# Patient Record
Sex: FEMALE | Race: ASIAN | NOT HISPANIC OR LATINO | Employment: FULL TIME | URBAN - METROPOLITAN AREA
[De-identification: names, ages, dates, MRNs, and addresses within clinical notes are randomized per-mention and may not be internally consistent; named-entity substitution may affect disease eponyms.]

---

## 2017-09-29 ENCOUNTER — HOSPITAL ENCOUNTER (OUTPATIENT)
Dept: ULTRASOUND IMAGING | Facility: HOSPITAL | Age: 42
Discharge: HOME | End: 2017-09-29
Attending: FAMILY MEDICINE

## 2017-09-29 ENCOUNTER — OFFICE VISIT - HEALTHEAST (OUTPATIENT)
Dept: FAMILY MEDICINE | Facility: CLINIC | Age: 42
End: 2017-09-29

## 2017-09-29 DIAGNOSIS — R10.9 ABDOMINAL PAIN: ICD-10-CM

## 2017-10-05 ENCOUNTER — OFFICE VISIT - HEALTHEAST (OUTPATIENT)
Dept: FAMILY MEDICINE | Facility: CLINIC | Age: 42
End: 2017-10-05

## 2017-10-05 DIAGNOSIS — L70.9 ACNE: ICD-10-CM

## 2017-10-05 DIAGNOSIS — Z23 IMMUNIZATION DUE: ICD-10-CM

## 2017-10-05 DIAGNOSIS — K29.70 GASTRITIS: ICD-10-CM

## 2017-10-05 ASSESSMENT — MIFFLIN-ST. JEOR: SCORE: 1186.59

## 2017-10-12 ASSESSMENT — MIFFLIN-ST. JEOR: SCORE: 1168.45

## 2017-10-13 ENCOUNTER — SURGERY - HEALTHEAST (OUTPATIENT)
Dept: GASTROENTEROLOGY | Facility: HOSPITAL | Age: 42
End: 2017-10-13

## 2017-10-13 ASSESSMENT — MIFFLIN-ST. JEOR: SCORE: 1182.06

## 2017-10-15 ENCOUNTER — COMMUNICATION - HEALTHEAST (OUTPATIENT)
Dept: FAMILY MEDICINE | Facility: CLINIC | Age: 42
End: 2017-10-15

## 2017-11-01 ENCOUNTER — COMMUNICATION - HEALTHEAST (OUTPATIENT)
Dept: FAMILY MEDICINE | Facility: CLINIC | Age: 42
End: 2017-11-01

## 2017-11-03 ENCOUNTER — RECORDS - HEALTHEAST (OUTPATIENT)
Dept: MAMMOGRAPHY | Facility: CLINIC | Age: 42
End: 2017-11-03

## 2017-11-03 DIAGNOSIS — Z12.31 ENCOUNTER FOR SCREENING MAMMOGRAM FOR MALIGNANT NEOPLASM OF BREAST: ICD-10-CM

## 2017-11-06 ENCOUNTER — OFFICE VISIT - HEALTHEAST (OUTPATIENT)
Dept: FAMILY MEDICINE | Facility: CLINIC | Age: 42
End: 2017-11-06

## 2017-11-06 DIAGNOSIS — Z41.1 ENCOUNTER FOR BREAST AUGMENTATION: ICD-10-CM

## 2017-11-06 DIAGNOSIS — Z01.818 PREOPERATIVE TESTING: ICD-10-CM

## 2017-11-06 ASSESSMENT — MIFFLIN-ST. JEOR: SCORE: 1164.2

## 2017-11-07 ENCOUNTER — RECORDS - HEALTHEAST (OUTPATIENT)
Dept: ADMINISTRATIVE | Facility: OTHER | Age: 42
End: 2017-11-07

## 2017-11-08 ENCOUNTER — HOSPITAL ENCOUNTER (OUTPATIENT)
Dept: MAMMOGRAPHY | Facility: HOSPITAL | Age: 42
Discharge: HOME | End: 2017-11-08
Attending: FAMILY MEDICINE

## 2017-11-08 DIAGNOSIS — R92.8 ABNORMAL SCREENING MAMMOGRAM: ICD-10-CM

## 2017-11-08 DIAGNOSIS — N63.20 LEFT BREAST MASS: ICD-10-CM

## 2017-11-09 LAB
LAB AP CHARGES (HE HISTORICAL CONVERSION): NORMAL
PATH REPORT.COMMENTS IMP SPEC: NORMAL
PATH REPORT.COMMENTS IMP SPEC: NORMAL
PATH REPORT.FINAL DX SPEC: NORMAL
PATH REPORT.GROSS SPEC: NORMAL
PATH REPORT.MICROSCOPIC SPEC OTHER STN: NORMAL
PATH REPORT.RELEVANT HX SPEC: NORMAL
RESULT FLAG (HE HISTORICAL CONVERSION): NORMAL

## 2017-11-10 ENCOUNTER — COMMUNICATION - HEALTHEAST (OUTPATIENT)
Dept: MAMMOGRAPHY | Facility: HOSPITAL | Age: 42
End: 2017-11-10

## 2018-04-02 ENCOUNTER — RECORDS - HEALTHEAST (OUTPATIENT)
Dept: ADMINISTRATIVE | Facility: OTHER | Age: 43
End: 2018-04-02

## 2019-05-20 ENCOUNTER — COMMUNICATION - HEALTHEAST (OUTPATIENT)
Dept: SCHEDULING | Facility: CLINIC | Age: 44
End: 2019-05-20

## 2019-05-22 ENCOUNTER — OFFICE VISIT - HEALTHEAST (OUTPATIENT)
Dept: FAMILY MEDICINE | Facility: CLINIC | Age: 44
End: 2019-05-22

## 2019-05-22 DIAGNOSIS — J20.9 ACUTE BRONCHITIS, UNSPECIFIED ORGANISM: ICD-10-CM

## 2019-12-10 ENCOUNTER — TRANSFERRED RECORDS (OUTPATIENT)
Dept: MULTI SPECIALTY CLINIC | Facility: CLINIC | Age: 44
End: 2019-12-10

## 2019-12-10 LAB
CHOLESTEROL (EXTERNAL): 222 MG/DL (ref 100–199)
GLUCOSE (EXTERNAL): 98 MG/DL (ref 65–100)
HDLC SERPL-MCNC: 40 MG/DL
LDL CHOLESTEROL (EXTERNAL): 142 MG/DL
NON HDL CHOLESTEROL (EXTERNAL): 182 MG/DL
PAP-ABSTRACT: NORMAL
TRIGLYCERIDES (EXTERNAL): 220 MG/DL
TSH SERPL-ACNC: 0.09 UIU/ML (ref 0.35–4.94)

## 2020-03-15 ENCOUNTER — VIRTUAL VISIT (OUTPATIENT)
Dept: FAMILY MEDICINE | Facility: OTHER | Age: 45
End: 2020-03-15

## 2020-03-15 NOTE — PROGRESS NOTES
"Date: 03/15/2020 11:59:36  Clinician: Rocio Garcia  Clinician NPI: [id number]  Patient: Neida Rock  Patient : 1975  Patient Address: [address] [place] [address]  Patient Phone: [phone]  Visit Protocol: URI  Patient Summary:  Neida is a 44 year old ( : 1975 ) female who initiated a Visit for COVID-19 (Coronavirus) evaluation and screening. When asked the question \"Please sign me up to receive news, health information and promotions. \", Neida responded \"No\".    Neida states her symptoms started gradually 3-6 days ago. After her symptoms started, they improved and then got worse again.   Her symptoms consist of malaise, myalgia, chills, a cough, and nasal congestion. She is experiencing difficulty breathing due to nasal congestion but she is not short of breath.   Symptom details     Nasal secretions: The color of her mucus is yellow.    Cough: Neida coughs a few times an hour and her cough is not more bothersome at night. Phlegm does not come into her throat when she coughs. She does not believe her cough is caused by post-nasal drip.      Neida denies having ear pain, fever, headache, rhinitis, facial pain or pressure, wheezing, sore throat, and teeth pain. She also denies having a sinus infection within the past year and having recent facial or sinus surgery in the past 60 days.   Precipitating events  She has not recently been exposed to someone with influenza. Neida has been in close contact with the following high risk individuals: people with asthma, heart disease or diabetes, adults 65 or older, and children under the age of 5.   Pertinent COVID-19 (Coronavirus) information  Neida has traveled internationally or to the areas where COVID-19 (Coronavirus) is widespread in the last 14 days before the start of her symptoms. Countries or locations traveled as reported by the patient (free text): [place] and [place]   Neida has had close contact with a suspected or " laboratory-confirmed COVID-19 patient within 14 days of symptom onset.   Neida is not a healthcare worker and does not work in a healthcare facility.   Pertinent medical history  Neida has taken an antibiotic medication in the past month. Antibiotic details as reported by the patient (free text): Minocycline for cystic acne   Neida does not get yeast infections when she takes antibiotics.   Neida does not need a return to work/school note.   Weight: 130 lbs   Neida does not smoke or use smokeless tobacco.   She denies pregnancy and denies breastfeeding. She does not menstruate.   Weight: 130 lbs    MEDICATIONS: minocycline oral, levothyroxine oral, ALLERGIES: magnesium sulfate  Clinician Response:  Dear Neida,   Dear Neida Rock,  Based on the information you have provided, it is recommended that you go to one of our designated Corona Virus 19 testing centers to get a test done from your car. To do this follow these instructions:  You should go to one of our dedicated testing centers as soon as possible during the hours below at one of these locations:   Walk-in Care: [facility] at [address] [address]. Hours: M-F 7am - 6pm, Sat-Sun 8am -- 3pm   [facility] at [address] [address]. Hours: Every Day 9am -- 7pm  Walk-in Care: [facility] at [address] [place] [address]. Hours: M-F 7am - 6pm, Sat-Sun 8am -- 3pm  [facility] [place] [address] [place] [address]. Hours: M-F 11am -- 7pm, Sat-Sun 9am-4pm   What to expect:   When you arrive please come park in the parking lot.  Call [phone] and let them know which of the four clinics you are at, description of your car and where you are parked. Mention you did an [facility] visit and were sent for testing.  They will add you to the queue to get your test (you will stay in your car the entire time).  On that phone call you will give them the  information to register your for the visit.  You will then be met by a provider who will perform a brief assessment in your car and collect samples to send for Corona Virus 19, influenza and possibly RSV.  You will be given patient information about respiratory illnesses and instructions. You with the results if you are not on mychart.   Isolate Yourself:   Isolate yourself while traveling.  Do Not allow any visitors within 6 feet.  Do Not go to work or school.  Do Not go to [employer],  centers, shopping, or other public places.  Do Not shake hands.  Avoid close contact with others (hugging, kissing).  Protect Others:  Cover Your Mouth and Nose with a mask, disposable tissue or wash cloth to avoid spreading germs to others.  Wash your hands and face frequently with soap and water   Fever Medicines:   For fever relief, take acetaminophen or ibuprofen.  Treat fevers above 101deg F (38.3deg C) to lower fevers and make you more comfortable.   Acetaminophen (e.g., Tylenol): Take 650 mg (two 325 mg pills) by mouth every 4-6 hours as needed of regular strength Tylenol or 1,000 mg (two 500 mg pills) every 8 hours as needed of Extra Strength Tylenol.   Ibuprofen (e.g., Motrin, Advil): Take 400 mg (two 200 mg pills) by mouth every 6 hours as needed.   Acetaminophen is thought to be safer than ibuprofen or naproxen for people over 65 years old. Acetaminophen is in many OTC and prescription medicines. It might be in more than one medicine that you are taking. You need to be careful and not take an overdose. Before taking any medicine, read all the instructions on the package.  Caution -NSAIDs (e.g., ibuprofen, naproxen): Do not take nonsteroidal anti-inflammatory drugs (NSAIDs) if you have stomach problems, kidney disease, heart failure, or other contraindications to using this type of medicine. Do not take NSAID medicines for over 7 days without consulting your PCP. Do not take NSAID medicines if you are pregnant. Do  not take NSAID medicines if you are also taking blood thinners.   Call Back If: Breathing difficulty develops or you become worse.  Thank you for limiting contact with others, wearing a simple mask to cover your cough, practice good hand hygiene habits and accessing our virtual services where possible to limit the spread of this virus.  For more information about COVID19 and options for caring for yourself at home, please visit the CDC website at https://www.cdc.gov/coronavirus/2019-ncov/about/steps-when-sick.html  For more options for care at [facility], please visit our website at [email or web address]    Diagnosis: Cough  Diagnosis ICD: R05

## 2020-03-16 ENCOUNTER — OFFICE VISIT - HEALTHEAST (OUTPATIENT)
Dept: FAMILY MEDICINE | Facility: CLINIC | Age: 45
End: 2020-03-16

## 2020-03-16 DIAGNOSIS — R05.9 COUGH: ICD-10-CM

## 2020-03-22 ENCOUNTER — COMMUNICATION - HEALTHEAST (OUTPATIENT)
Dept: SCHEDULING | Facility: CLINIC | Age: 45
End: 2020-03-22

## 2020-04-24 ENCOUNTER — RECORDS - HEALTHEAST (OUTPATIENT)
Dept: ADMINISTRATIVE | Facility: OTHER | Age: 45
End: 2020-04-24

## 2020-07-02 ENCOUNTER — RECORDS - HEALTHEAST (OUTPATIENT)
Dept: ADMINISTRATIVE | Facility: OTHER | Age: 45
End: 2020-07-02

## 2021-03-11 ENCOUNTER — RECORDS - HEALTHEAST (OUTPATIENT)
Dept: ADMINISTRATIVE | Facility: OTHER | Age: 46
End: 2021-03-11

## 2021-05-29 NOTE — TELEPHONE ENCOUNTER
Pt is calling in with a persistent cough that has been going on for 3 days. Pt said it is interfering with sleep, and the dry cough is causing headaches and sore throat. Pt denies fever, and runny nose.   Home care measures discussed. Warm fluids, and humidified air. Pt has been trying dayquill, and hot tea, and it has not been working.    Per protocol, pt should be evaluated within 24 hours. Pt agrees with plan, and was tranferred to scheduling make an appointment, but was not able to schedule an appointment, so will go to the [facility] in the morning. Advised pt to call back if symptoms worsen overnight.     Anthony Saenz, RN [facility] Triage/Medication Refill      Reason for Disposition    [1] Continuous (nonstop) coughing interferes with work or school AND [2] no improvement using cough treatment per protocol    Protocols used: COUGH - ACUTE NON-PRODUCTIVE-A-AH

## 2021-05-29 NOTE — PROGRESS NOTES
Chief Complaint   Patient presents with     Cough     Sore Throat         HPI:   Neida Rock is a 43 y.o. female with  c/o cough for the last four days.  No fever, but has felt warm.  Cough is productive today.  Mild shortness of breath.  Has had chest burning with coughing.  Feels very tired.  No ear pain.  Sore throat.  Stuffy nose.  No stomach pain.  No dysuria.  No rash.  Headache.  Having a hard time sleeping at night.  Feels a little better today.    Using dayquil/robitusson.    No known exposure.  No tobacco use.    ROS:  A 10 point comprehensive review of systems was negative except as noted.     Medications:  Current Outpatient Medications on File Prior to Visit   Medication Sig Dispense Refill     levothyroxine (SYNTHROID, LEVOTHROID) 100 MCG tablet Take 100 mcg by mouth Daily at 6:00 am.       omeprazole (PRILOSEC) 20 MG capsule Take 1 capsule (20 mg total) by mouth daily before breakfast. 30 capsule 6     No current facility-administered medications on file prior to visit.          Social History:  Social History     Tobacco Use     Smoking status: Never Smoker     Smokeless tobacco: Never Used   Substance Use Topics     Alcohol use: No         Physical Exam:   Vitals:    05/22/19 1054   BP: 96/60   Pulse: 86   Temp: 98.6  F (37  C)   TempSrc: Oral   SpO2: 98%   Weight: 138 lb (62.6 kg)       GENERAL:   Alert. Oriented.  EYES: Clear  HENT:  Ears: R TM pearly gray. Normal landmarks. L TM pearly gray.  Normal landmarks  Nose: Clear.  Sinuses: Nontender.  Oropharynx:  No erythema. No exudate.  NECK: Supple. No adenopathy.  LUNGS: Clear to ascultation.  No crackles.  No wheezing  HEART: RRR  SKIN:  No rash.         Assessment/Plan:    1. Acute bronchitis, unspecified organism  benzonatate (TESSALON) 100 MG capsule      Discussed viral infection.  No antibiotics indicated.   May use over the counter cough and cold medication as needed.  Given benzonatate   Recheck if worsening or not  improving.              Federico Patel MD      5/22/2019    The following portions of the patient's history were reviewed and updated as appropriate: allergies, current medications, past family history, past medical history, past social history, past surgical history and problem list.

## 2021-05-31 VITALS — BODY MASS INDEX: 24.55 KG/M2 | HEIGHT: 61 IN | WEIGHT: 130 LBS

## 2021-05-31 VITALS — WEIGHT: 126.06 LBS | HEIGHT: 61 IN | BODY MASS INDEX: 23.8 KG/M2

## 2021-05-31 VITALS — HEIGHT: 61 IN | BODY MASS INDEX: 24.73 KG/M2 | WEIGHT: 131 LBS

## 2021-05-31 VITALS — BODY MASS INDEX: 23.56 KG/M2 | WEIGHT: 124.7 LBS

## 2021-06-02 VITALS — BODY MASS INDEX: 26.07 KG/M2 | WEIGHT: 138 LBS

## 2021-06-06 NOTE — PROGRESS NOTES
SUBJECTIVE: Here for curbside evaluation for coronaviruse referred through [facility].     Reports no new symptoms.     OBJECTIVE: no apparent distress  Eyes appear normal  Mucous membranes moist  Non diaphoretic.   No increased work of breathing   Mental status appears normal/affect normal       1. Cough  Coronavirus SARS-CoV-2 by PCR     Over the counter meds and isolation discussed until results in.   Education information given. Time of visit was 15 minutes more than half in coordination of care and counseling regarding COVID and self-isolation    RUSS Perry, CNP

## 2021-06-06 NOTE — PATIENT INSTRUCTIONS - HE
You are being tested for Corona virus and possibly Influenza and/or RSV.    We will call you with your results.    Isolate Yourself:    Isolate yourself while traveling.    Do Not allow any visitors within 6 feet.    Do Not go to work or school.    Do Not go to [employer],  centers, shopping, or other public places.    Do Not shake hands.    Avoid close contact with others (hugging, kissing).    Protect Others:    Cover Your Mouth and Nose with a mask, disposable tissue or wash cloth to avoid spreading germs to others.    Wash your hands and face frequently with soap and water    Call Back If: Breathing difficulty develops or you become worse.    For more information about COVID19 and options for caring for yourself at home, please visit the CDC website at https://www.cdc.gov/coronavirus/2019-ncov/about/steps-when-sick.html  For more options for care at [facility], please visit our website at [email or web address]

## 2021-06-07 NOTE — TELEPHONE ENCOUNTER
Coronavirus (COVID-19) Notification    Reason for call  Notify of Negative COVID-19 lab result, assess symptoms,  review [facility] recommendations    Lab Result    Lab test 2019-nCoV rRt-PCR  Oropharyngeal AND/OR nasopharyngeal swabs were NEGATIVE for 2019-nCoV RNA    Left voicemail message requesting a callback to [phone] between 10 a.m. and 6:30 p.m. for ED/UC lab results.      {Name]  Vidal Manning LPN

## 2021-06-13 NOTE — PROGRESS NOTES
Chief Complaint   Patient presents with     Abdominal Pain     [facility]  Pt not having pain currently         HPI:   Neida Rock is a 41 y.o. female c/o stomach pain after eating for the last two weeks.  [facility]--had negative RUQ US.  Seen in ER three days ago--had some inflammation of bowel on CT.  Started on omeprazole--hasn't had any pain since starting that.  No fever.  Stomach pain is associated with nausea, vomiting, diarrhea.  Had some black stools 5 days ago.  No bright red blood.  Has lost 8-9 lbs since August--trying to lose weight.  Has never had trouble like this before.  No association with any particular foods.--Has been staying away from spicy foods and fatty foods for two days.  Has had some heartburn when experiencing the pain    Has been on minocycline for the last 3 months for acne.    Family members have had ulcers.        ROS:  Constitutional: No fatigue, weakness, weight loss or gain, fevers, night sweats   Eyes:  no changes in visual acuity, diplopia or amaurosis, no discharge, matting, redness, tearing or eye pain.    ENT: no hearing loss, ear pain, tinnitus or aural discharge. no sore throat, dental pain, hoarseness, dysphagia, oral or tongue lesions.    no nasal stuffiness, discharge, coryza or bleeding. No sinus pain.    Respiratory: No cough, chest pain, dyspnea, wheezing or hemoptysis.      CV: No exertional chest pain, dyspnea, palpitations, syncope, orthopnea, edema or paroxysmal nocturnal dyspnea.   GI: as per HPI  : negative   SKIN: negative   MS: negative   NEURO: No symptoms of neurological impairment or TIA's; no amaurosis, diplopia, dysphasia, or unilateral disturbance of motor or sensory function. No loss of balance or vertigo.         Medications:  Current Outpatient Prescriptions on File Prior to Visit   Medication Sig Dispense Refill     levothyroxine (SYNTHROID, LEVOTHROID) 100 MCG tablet TAKE 1 TABLET BY MOUTH BEFORE BREAKFAST.  1      "minocycline (MINOCIN,DYNACIN) 100 MG capsule TAKE ONE CAPSULE BY MOUTH ONCE A DAY  0     [DISCONTINUED] omeprazole (PRILOSEC) 20 MG capsule Take 1 capsule (20 mg total) by mouth daily before breakfast. 28 capsule 0     traMADol (ULTRAM) 50 mg tablet Take 1 tablet (50 mg total) by mouth every 6 (six) hours as needed. 10 tablet 0     [DISCONTINUED] ondansetron (ZOFRAN ODT) 4 MG disintegrating tablet Take 1 tablet (4 mg total) by mouth every 8 (eight) hours as needed. 20 tablet 0     No current facility-administered medications on file prior to visit.          Social History:  Social History   Substance Use Topics     Smoking status: Never Smoker     Smokeless tobacco: Not on file     Alcohol use No         Physical Exam:   Vitals:    10/05/17 1133   BP: 110/78   Patient Site: Left Arm   Patient Position: Sitting   Cuff Size: Adult Regular   Pulse: 80   Resp: 12   Temp: 98.3  F (36.8  C)   TempSrc: Oral   Weight: 131 lb (59.4 kg)   Height: 5' 1\" (1.549 m)       GENERAL:   Alert. Oriented.  EYES: Clear  HENT:  Ears: R TM pearly gray. Normal landmarks. L TM pearly gray.  Normal landmarks  Nose: Clear.  Sinuses: Nontender.  Oropharynx:  No erythema. No exudate.  NECK: Supple. No adenopathy.  LUNGS: Clear to ascultation.  No crackles.  No wheezing  HEART: RRR    SKIN: scattered skin colored papules over face    CHART REVIEW  DATE[facility]place of visit[facility]29/17[facility]  DX: abdominal pain  TESTS: abd xray--neg; abd US:  Negative; UA negative  TREATMENT: observation      DATE/place of visit: 10/2/17; [facility]  DX: abdominal Pain-- suspect viral Illness with underlying GERD  TESTS: CMP nrmal.D-dimer normal; CT mild diffuse bowle inflammation  TREATMENT: IV fluids, analgesic, antiemetics        Assessment/Plan:    1. Gastritis  omeprazole (PRILOSEC) 20 MG capsule   2. Acne  clindamycin (CLEOCIN T) 1 % external solution   3. Immunization due  Influenza, Seasonal,Quad Inj, 36+ MOS      Has improved on omeprazole.  Discussed " may have viral enteritis, inflammation due to oral minocycline.  Less likely ulcer--but this is the treatment.  Recommended stopping minocycline and will try topical clindamycin.  Continue omeprazole for 6-8 weeks, and if improved, can stop.  If worsens off, restart and use for another 4-6 months  Recheck if pain is persistent or can't stop within 4-6 months          The following portions of the patient's history were reviewed and updated as appropriate: allergies, current medications, past family history, past medical history, past social history, past surgical history and problem list.    Federico Patel MD      10/5/2017

## 2021-06-13 NOTE — PROGRESS NOTES
Assessment/Plan:      Visit for Preoperative Exam.     Patient approved for surgery with general or local anesthesia. Follow up as needed. Proceed with proposed surgery without additional clinical clarifications.     Subjective:     Scheduled Procedure:Breast Augmentation  Surgery Date:  11/13/17  Surgery Location:  [facility]  Surgeon: Dr. Caban [facility] - [phone] fax    Neida is a very pleasant 41 year old female presenting to the clinic for a preoperative evaluation for a breast augmentation.    She has a past medical history significant for hypothyroidism and sees her endocrinologist later this week - most recent TSH was 14 with a FT4 of 0.6. - she is asymptomatic and expects that her endocrinologist will likely increase her meds a bit.    Current Outpatient Prescriptions   Medication Sig Dispense Refill     levothyroxine (SYNTHROID, LEVOTHROID) 100 MCG tablet Take 100 mcg by mouth Daily at 6:00 am.       omeprazole (PRILOSEC) 20 MG capsule Take 1 capsule (20 mg total) by mouth daily before breakfast. 30 capsule 6     No current facility-administered medications for this visit.        Allergies   Allergen Reactions     Sulfamethoxazole-Trimethoprim Other (See Comments)     Bactrim DS  Drug induced meningitis     Tramadol Hives       Immunization History   Administered Date(s) Administered     Influenza, seasonal,quad inj 36+ mos 10/05/2017     Tdap 02/10/2015       Patient Active Problem List   Diagnosis     History of Aseptic meningitis due to drug     Gastritis     Hypothyroidism     Acne     Abdominal pain     Epigastric pain     Melena       Past Medical History:   Diagnosis Date     Hyperthyroidism        Family History   Problem Relation Age of Onset     Diabetes Paternal Grandmother      Diabetes       Diabetes Mother      Diabetes Sister      Diabetes Maternal Grandmother      Diabetes Sister      No Medical Problems Father        Social History     Social History      Marital status:      Spouse name: N/A     Number of children: N/A     Years of education: N/A     Occupational History     Not on file.     Social History Main Topics     Smoking status: Never Smoker     Smokeless tobacco: Never Used     Alcohol use No     Drug use: No     Sexual activity: Not on file     Other Topics Concern     Not on file     Social History Narrative       Past Surgical History:   Procedure Laterality Date     ESOPHAGOGASTRODUODENOSCOPY N/A 10/13/2017    Procedure: ESOPHAGOGASTRODUODENOSCOPY (EGD) with biopsies;  Surgeon: Seth Cummings MD;  Location: [facility];  Service:      none         History of Present Illness  Recent Health  Fever: no  Chills: no  Fatigue: no  Chest Pain: no  Cough: no  Dyspnea: no  Urinary Frequency: no  Nausea: no  Vomiting: no  Diarrhea: no  Abdominal Pain: no  Easy Bruising: no  Lower Extremity Swelling: no  Poor Exercise Tolerance: no    Most recent Health Maintenance Visit:  2 year(s) ago    Pertinent History  Prior Anesthesia: no  Previous Anesthesia Reaction:  no  Diabetes: no  Cardiovascular Disease: no  Pulmonary Disease: no  Renal Disease: no  GI Disease: no  Sleep Apnea: no  Thromboembolic Problems: no  Clotting Disorder: no  Bleeding Disorder: no  Transfusion Reaction: no  Impaired Immunity: no  Steroid use in the last 6 months: none  Frequent Aspirin use: no    Family history: See above - no family Hx of easy bruising/bleeding or anesthesia reactions    Social history of there is no transfusion refusal and there are no concerns regarding care after surgery    After surgery, the patient plans to recover at home with family.    Review of Systems  Pertinent items are noted in HPI.  Constitutional: negative  Eyes: negative  Ears, nose, mouth, throat, and face: negative  Respiratory: negative  Cardiovascular: negative  Gastrointestinal: negative  Genitourinary:negative  Integument/breast: negative  Hematologic/lymphatic:  "negative  Musculoskeletal:negative  Neurological: negative  Behavioral/Psych: negative  Endocrine: negative  Allergic/Immunologic: negative          Objective:         Vitals:    11/06/17 1423   BP: 98/60   Pulse: 64   Resp: 16   Temp: 98.4  F (36.9  C)   TempSrc: Oral   Weight: 126 lb 1 oz (57.2 kg)   Height: 5' 1\" (1.549 m)       Vital signs reviewed and stable  General: No acute distress  Psych: Appropriate affect  HEENT: moist mucous membranes, pupils equal, round, reactive to light and accomodation, posterior oropharynx clear of erythema or exudate, tympanic membranes are pearly grey bilaterally  Lymph: no cervical or supraclavicular lymphadenopathy  Cardiovascular: regular rate and rhythm with no murmur  Pulmonary: clear to auscultation bilaterally with no wheeze  Abdomen: soft, non tender, non distended with normo-active bowel sounds  Extremities: warm and well perfused with no edema  Skin: warm and dry with no rash       Recent Results (from the past 240 hour(s))   Hemoglobin   Result Value Ref Range    Hemoglobin 12.8 12.0 - 16.0 g/dL   Pregnancy (Beta-hCG, Qual), Urine   Result Value Ref Range    Pregnancy Test, Urine Negative Negative    Specific Gravity, UA 1.020 1.001 - 1.030       "

## 2021-06-13 NOTE — PROGRESS NOTES
Chief Complaint   Patient presents with     Abdominal Pain     on and off x 2 weeks. Comes on after she eats.   STates it is very painful, has nausea, bloating and diarrhea.       HPI:    Patient is here for 2 wks of moderate to severe intermittent right sided abdominal pain described as started at right lower abdomen then radiating up to right upper abdomen, associated with nausea, worsened with foods. She also reported having alternating loose with hard stools, sometimes needing to strain for bowel movement. Currently she has only minimal pain. NO hx of abdominal surgeries. She is on Mirena. Her LMP was early August. No urinary symptoms, fever, chills, chest pain, shortness of breath.    ROS: Pertinent ROS noted in HPI.     Allergies   Allergen Reactions     Sulfamethoxazole-Trimethoprim Other (See Comments)     Bactrim DS  Drug induced meningitis       Patient Active Problem List   Diagnosis     Hyperbilirubinemia     Neck pain     LFTs abnormal     Febrile illness     Aseptic meningitis due to drug       Family History   Problem Relation Age of Onset     Diabetes       Diabetes Mother      Diabetes Sister      Diabetes Maternal Grandmother      Diabetes Paternal Grandmother      Diabetes Sister        Social History     Social History     Marital status:      Spouse name: N/A     Number of children: N/A     Years of education: N/A     Occupational History     Not on file.     Social History Main Topics     Smoking status: Never Smoker     Smokeless tobacco: Not on file     Alcohol use No     Drug use: No     Sexual activity: Not on file     Other Topics Concern     Not on file     Social History Narrative         Objective:    Vitals:    09/29/17 1702   BP: 110/68   Pulse: 72   Resp: 20   Temp: 98  F (36.7  C)   SpO2: 100%       Gen:NAD  CV: RRR, no M, R, G  Pulm: CTAB, normal effort  Abd: Normal bowel sounds, soft, mild tenderness at right upper and right lower abdomen, no HSM/mass. No CVA tenderness.        Recent Results (from the past 24 hour(s))   Urinalysis-UC if Indicated   Result Value Ref Range    Color, UA Yellow Colorless, Yellow, Straw, Light Yellow    Clarity, UA Clear Clear    Glucose, UA Negative Negative    Bilirubin, UA Negative Negative    Ketones, UA Negative Negative    Specific Gravity, UA 1.015 1.005 - 1.030    Blood, UA Negative Negative    pH, UA 5.5 5.0 - 8.0    Protein, UA Negative Negative mg/dL    Urobilinogen, UA 0.2 E.U./dL 0.2 E.U./dL, 1.0 E.U./dL    Nitrite, UA Negative Negative    Leukocytes, UA Negative Negative   Pregnancy, Urine   Result Value Ref Range    Pregnancy Test, Urine Negative Negative    Specific Gravity, UA 1.015 1.001 - 1.030       Xr Abdomen Flat And Upright    Result Date: 9/29/2017  XR ABDOMEN FLAT AND UPRIGHT 9/29/2017 5:59 PM INDICATION: right sided abdominal pain, assess for constipation COMPARISON: None. FINDINGS: Negative abdomen. Bowel gas pattern is normal. Nothing for obstruction or free air. No evidence for renal stones. Mild stool within normal limits. Intrauterine device in good position. This report was electronically interpreted by: Dr. Benny Auguste MD ON 09/29/2017 at 18:19    Us Abdomen Limited    Result Date: 9/29/2017  US ABDOMEN LIMITED 9/29/2017 6:50 PM INDICATION: right upper quadrant pain, assess for cholecystitis COMPARISON: None. FINDINGS: GALLBLADDER: Normal, without cholelithiasis. BILE DUCTS: No bile duct dilation. The common bile duct measures 3 mm. LIVER: Normal where seen. RIGHT KIDNEY: No hydronephrosis. PANCREAS: Not imaged in detail on this limited study. No incidental abnormalities. No ascites in the right upper quadrant.     CONCLUSION: 1.  Normal RUQ ultrasound.    Abd XR - no acute findings per my interpretation, discussed during visit.      Abdominal pain  -     Urinalysis-UC if Indicated  -     Pregnancy, Urine  -     XR Abdomen Flat and Upright  -     Nursing communication  -     US Abdomen Limited; Future      Discussed  differentials including cholecystitis, appendicitis, ovarian pathology, UTI/pyelonephritis, diverticulitis, constipation, IBS vs others.    Mild tenderness one exam only. After US I spoke with patient and she reported no pain, so I advised her to go home and f/u in ER or back in urgent care if her symptoms return over night or tomorrow, where a CT abd/pelvis might be needed. She verbalized understanding of plan.

## 2021-06-19 ENCOUNTER — HEALTH MAINTENANCE LETTER (OUTPATIENT)
Age: 46
End: 2021-06-19

## 2021-07-03 NOTE — ADDENDUM NOTE
Addendum Note by Devyn Ramirez CLS at 3/16/2020  2:10 PM     Author: Devyn Ramirez CLS Service: -- Author Type:     Filed: 3/19/2020  4:34 PM Encounter Date: 3/16/2020 Status: Signed    : Devyn Ramirez CLS ()    Addended by: DEVYN RAMIREZ on: 3/19/2020 04:34 PM        Modules accepted: Orders

## 2021-10-09 ENCOUNTER — HEALTH MAINTENANCE LETTER (OUTPATIENT)
Age: 46
End: 2021-10-09

## 2022-01-03 ENCOUNTER — NURSE TRIAGE (OUTPATIENT)
Dept: NURSING | Facility: CLINIC | Age: 47
End: 2022-01-03
Payer: COMMERCIAL

## 2022-01-03 NOTE — TELEPHONE ENCOUNTER
Telephone call:     Patient calling to schedule COVID test for travel for her and her daughter. She previously spoke with a  and made an appointment in clinic for what she states is for COVID testing.   Denies symptoms.     Discussed process for COVID testing, provider order and e-visit.   She verbalizes understanding and plans on setting up e-visit for testing.     Loree Krishna RN   01/03/22 2:12 PM  [facility] Nurse Advisor

## 2022-01-21 ENCOUNTER — OFFICE VISIT (OUTPATIENT)
Dept: FAMILY MEDICINE | Facility: CLINIC | Age: 47
End: 2022-01-21
Payer: COMMERCIAL

## 2022-01-21 ENCOUNTER — NURSE TRIAGE (OUTPATIENT)
Dept: NURSING | Facility: CLINIC | Age: 47
End: 2022-01-21
Payer: COMMERCIAL

## 2022-01-21 VITALS
OXYGEN SATURATION: 99 % | TEMPERATURE: 98.2 F | SYSTOLIC BLOOD PRESSURE: 128 MMHG | DIASTOLIC BLOOD PRESSURE: 82 MMHG | BODY MASS INDEX: 29.29 KG/M2 | RESPIRATION RATE: 16 BRPM | HEART RATE: 83 BPM | WEIGHT: 155 LBS

## 2022-01-21 DIAGNOSIS — M77.12 LATERAL EPICONDYLITIS OF LEFT ELBOW: Primary | ICD-10-CM

## 2022-01-21 PROCEDURE — 99213 OFFICE O/P EST LOW 20 MIN: CPT | Performed by: PHYSICIAN ASSISTANT

## 2022-01-21 NOTE — PATIENT INSTRUCTIONS
Please see the handouts at the end of the packet about lateral epicondylitis/tennis elbow which is inflammation of the tendon where it connects to the bone at your elbow.  For treatment you can use ice, ibuprofen, and provided armband.  Try to avoid activities that cause discomfort.  Your symptoms should slowly improve over the next 2-4 weeks.  If you are symptoms are not improving at 4 weeks, or symptoms are worsening, please reach out for follow-up and referral to physical therapy.  Reach out with any questions or concerns.      Patient Education     Understanding Lateral Epicondylitis     Tendons are strong bands of tissue that connect muscles to bones. Lateral epicondylitis affects the tendons that connect muscles in the forearm to the lateral epicondyle. This is the bony knob on the outer side of the elbow. The condition occurs if the extensor tendons of the wrist become painful and swollen (irritated). This can cause pain in the elbow, forearm, and wrist. Because the condition is sometimes caused by playing tennis, it's also known as  tennis elbow.     How to say it  [id number]   What causes lateral epicondylitis?  The condition most often occurs because of overuse. This can be from any activity that repeatedly puts stress on the forearm extensor muscles or tendons and wrist. For instance, playing tennis, lifting weights, cutting meat, painting, and typing can all cause the condition. Wear and tear of the tendons from aging or an injury to the tendons can also cause the condition.   Symptoms of lateral epicondylitis  The most common symptom is pain. You may feel it on the outer side of the elbow and down the back of the forearm. It may be worse when moving or using the elbow, forearm, or wrist. You may also feel pain when gripping or lifting things.   Treatment for lateral epicondylitis  Treatments may include:    Resting the elbow, forearm, and wrist. You ll need to avoid movements that  can make your symptoms worse. You also may need to avoid certain sports and types of work for a time. This helps relieve symptoms and prevent further damage to the tendons.    Changing the action that caused the problem. For instance, if the tendons were damaged from playing tennis, it may help to change your playing technique or use different equipment. This helps prevent further damage to the tendons.    Using cold packs. Putting an ice pack on the injured area can help reduce pain and swelling.    Taking pain medicines. Taking prescription or over-the-counter pain medicines may help reduce pain and swelling.      Wearing a brace. This helps reduce strain on the muscles and tendons in the forearm, which may relieve symptoms. It's very important to wear the brace properly.    Doing exercises and physical therapy. These help improve strength and range of motion in the elbow, forearm, and wrist.    Getting shots of medicine into the injured area. These may help relieve symptoms for a time.    Having surgery. This may be an option if other treatments fail to relieve symptoms. In many cases, the surgeon removes the damaged tissue.  Possible complications of lateral epicondylitis  If the tendons involved don t heal properly, symptoms may return or get worse. To help prevent this, follow your treatment plan as directed.   When to call your healthcare provider  Call your healthcare provider right away if you have any of these:    Fever of 100.4 F (38 C) or higher, or as directed by your provider    Chills    Redness, swelling, or warmth in the elbow or forearm that gets worse    Symptoms that don t get better with treatment, or get worse    New symptoms  Lorenzo last reviewed this educational content on 6/1/2019 2000-2021 [employer]. All rights reserved. This information is not intended as a substitute for professional medical care. Always follow your healthcare professional's instructions.            Patient Education     Treating Tennis Elbow    Your treatment will depend on how inflamed your tendon is. The goal is to ease your symptoms and help you regain full use of your elbow.  Rest and medicine  Wear a tennis elbow splint to let the inflamed tendon rest and heal. It must be worn correctly. It should be placed down the arm past the painful area of the elbow. It can make symptoms worse if it's directly over the inflamed tendon. Take stress off the tendon by using your other hand or changing your . Take NSAIDs (nonsteroidal anti-inflammatory drugs) and use ice to ease pain and swelling.  Exercises and therapy  Your healthcare provider may give you an exercise program. He or she may send you to a physical therapist. That expert will teach you how to gently stretch and strengthen the muscles around your elbow.  Anti-inflammatory shots  Your healthcare provider may give you shots of an anti-inflammatory medicine such as cortisone. This helps ease swelling. You may have more pain at first. But your elbow should feel better in a few days.  If surgery is needed  If your symptoms go on for a long time, or other treatments don t work, your healthcare provider may recommend surgery. Surgery repairs the inflamed tendon.  Lorenzo last reviewed this educational content on 1/1/2018 2000-2021 [employer]. All rights reserved. This information is not intended as a substitute for professional medical care. Always follow your healthcare professional's instructions.

## 2022-01-21 NOTE — TELEPHONE ENCOUNTER
Patient calling reporting pain and weakness in the left arm for the past few weeks. Reports initially feeling the arm is asleep with weakness and tenderness around the elbow. Reports this week feeling the pain more. Tried to get a massage to loosen the muscles with no relief. Reports it is difficult to lift a bottle water. Denies redness, swelling or fever. Advised per protocol to be seen in office in the next 3 days with patient agreeable to the plan.     Kirstin Watson RN 01/21/22 12:27 PM    Health Triage Nurse Advisor      Reason for Disposition    MODERATE pain (e.g. interferes with normal activities) and present > 3 days    Additional Information    Negative: Shock suspected (e.g., cold/pale/clammy skin, too weak to stand, low BP, rapid pulse)    Negative: Similar pain previously and it was from 'heart attack'    Negative: Similar pain previously from 'angina' and not relieved by nitroglycerin    Negative: Sounds like a life-threatening emergency to the triager    Negative: Followed an injury to arm    Negative: Chest pain    Negative: Wound looks infected    Negative: Elbow pain is the main symptom    Negative: Hand or wrist pain is the main symptom    Negative: Difficulty breathing or unusual sweating (e.g., sweating without exertion)    Negative: Chest pain lasting longer than 5 minutes    Negative: Age > 40 and no obvious cause for pain, pain still present even when not moving the arm    Negative: Fever and red area (or area very tender to touch)    Negative: Fever and swollen joint    Negative: Entire arm is swollen    Negative: Patient sounds very sick or weak to the triager    Negative: SEVERE pain (e.g., excruciating, unable to do any normal activities)    Negative: Red area or streak and large (> 2 in. or 5 cm)    Negative: Cast on wrist or arm and now increasing pain    Negative: Weakness (i.e., loss of strength) in hand or fingers    Negative: Arm pains with exertion (e.g., occurs with walking; goes  away on resting)    Negative: Painful rash with multiple small blisters grouped together (i.e., dermatomal distribution or 'band' or 'stripe')    Negative: Looks like a boil, infected sore, deep ulcer, or other infected rash (spreading redness, pus)    Negative: Localized rash is very painful (no fever)    Negative: Numbness (i.e., loss of sensation) in hand or fingers    Negative: Localized pain, redness or hard lump along vein    Negative: Patient wants to be seen    Protocols used: ARM PAIN-A-OH

## 2022-01-21 NOTE — PROGRESS NOTES
Assessment & Plan     Lateral epicondylitis of left elbow  Patient is a 46-year-old female presents to clinic due to 2 weeks of pain at lateral aspect of left elbow.  Vital signs normal.  Physical exam findings are consistent with lateral epicondylitis.  Discussed treatment options including ibuprofen, ice, and armband.  Recommended avoiding activities that cause discomfort.  Discussed expected course of recovery.  If symptoms are persistent, will consider referral to physical therapy.  Return precautions provided.     See Patient Instructions    Return in about 4 weeks (around 2/18/2022), or if symptoms worsen or fail to improve.    VERENICE Freire [facility] CALEB Carrasquillo is a 46 year old who presents for the following health issues     HPI     Arm Pain    Onset: 2 weeks of pain at lateral aspect of left elbow with all movements of arm or hand. Grabbing and lifting makes pain worse in this area.  Patient is left-handed.    Description:   Location: Left arm, near elbow  Character: Dull and tender, worse when reaching outwards or lifting something    Intensity: moderate    Progression of Symptoms: worse    Accompanying Signs & Symptoms:  Other symptoms: numbness and weakness of arm    History:   Previous similar pain: no       Precipitating factors:   Trauma or overuse: no     Alleviating factors:  Improved by: None    Therapies Tried and outcome: None        Objective    /82   Pulse 83   Temp 98.2  F (36.8  C) (Tympanic)   Resp 16   Wt 70.3 kg (155 lb)   LMP  (LMP Unknown)   SpO2 99%   BMI 29.29 kg/m    Body mass index is 29.29 kg/m .  Physical Exam  Vitals and nursing note reviewed.   Constitutional:       General: She is not in acute distress.     Appearance: Normal appearance.   HENT:      Head: Normocephalic and atraumatic.   Eyes:      Extraocular Movements: Extraocular movements intact.      Pupils: Pupils are equal, round, and reactive to light.    Cardiovascular:      Rate and Rhythm: Normal rate.   Pulmonary:      Effort: Pulmonary effort is normal.   Musculoskeletal:         General: Normal range of motion.      Cervical back: Normal range of motion.      Comments: Left upper extremity: Sensation intact.  Normal range of motion.    Strength:   and interosseous strength 5/5 bilaterally.  Elbow flexion/extension 5/5 bilaterally.   Tenderness palpation distal to lateral epicondyle without erythema, swelling, fluctuance.  Symptoms are reproduced with both resisted supination and pronation of left wrist.   Skin:     General: Skin is warm and dry.   Neurological:      General: No focal deficit present.      Mental Status: She is alert.   Psychiatric:         Mood and Affect: Mood normal.         Behavior: Behavior normal.

## 2022-01-31 LAB
CHOLESTEROL (EXTERNAL): 243 MG/DL (ref 100–199)
CREATININE (EXTERNAL): 0.7 MG/DL (ref 0.57–1.11)
GFR ESTIMATED (EXTERNAL): >60 ML/MIN/1.73M2
GFR ESTIMATED (IF AFRICAN AMERICAN) (EXTERNAL): >60 ML/MIN/1.73M2
GLUCOSE (EXTERNAL): 107 MG/DL (ref 65–100)
HBA1C MFR BLD: 6.1 %
HDLC SERPL-MCNC: 40 MG/DL
LDL CHOLESTEROL CALCULATED (EXTERNAL): 125 MG/DL
NON HDL CHOLESTEROL (EXTERNAL): 203 MG/DL
POTASSIUM (EXTERNAL): 4.3 MMOL/L (ref 3.5–5)
TRIGLYCERIDES (EXTERNAL): 390 MG/DL
TSH SERPL-ACNC: 3.73 ULU/ML (ref 0.35–4.94)

## 2022-02-08 ENCOUNTER — TRANSFERRED RECORDS (OUTPATIENT)
Dept: HEALTH INFORMATION MANAGEMENT | Facility: CLINIC | Age: 47
End: 2022-02-08
Payer: COMMERCIAL

## 2022-02-09 ENCOUNTER — OFFICE VISIT (OUTPATIENT)
Dept: FAMILY MEDICINE | Facility: CLINIC | Age: 47
End: 2022-02-09
Payer: COMMERCIAL

## 2022-02-09 VITALS
WEIGHT: 150.25 LBS | TEMPERATURE: 98.4 F | HEART RATE: 102 BPM | BODY MASS INDEX: 28.37 KG/M2 | OXYGEN SATURATION: 99 % | RESPIRATION RATE: 16 BRPM | HEIGHT: 61 IN | DIASTOLIC BLOOD PRESSURE: 80 MMHG | SYSTOLIC BLOOD PRESSURE: 110 MMHG

## 2022-02-09 DIAGNOSIS — L30.9 DERMATITIS: Primary | ICD-10-CM

## 2022-02-09 PROBLEM — R73.03 PREDIABETES: Status: ACTIVE | Noted: 2021-03-12

## 2022-02-09 PROCEDURE — 99214 OFFICE O/P EST MOD 30 MIN: CPT | Performed by: FAMILY MEDICINE

## 2022-02-09 RX ORDER — MINOCYCLINE HYDROCHLORIDE 100 MG/1
100 CAPSULE ORAL DAILY
COMMUNITY
End: 2022-04-19

## 2022-02-09 RX ORDER — LEVOTHYROXINE SODIUM 112 UG/1
112 TABLET ORAL DAILY
COMMUNITY
Start: 2022-02-03 | End: 2023-05-26 | Stop reason: DRUGHIGH

## 2022-02-09 RX ORDER — TRIAMCINOLONE ACETONIDE 1 MG/ML
LOTION TOPICAL 3 TIMES DAILY
Qty: 60 ML | Refills: 1 | Status: SHIPPED | OUTPATIENT
Start: 2022-02-09 | End: 2022-09-09

## 2022-02-09 RX ORDER — PREDNISONE 20 MG/1
20 TABLET ORAL 2 TIMES DAILY
Qty: 10 TABLET | Refills: 0 | Status: SHIPPED | OUTPATIENT
Start: 2022-02-09 | End: 2022-02-14

## 2022-02-09 ASSESSMENT — MIFFLIN-ST. JEOR: SCORE: 1268.91

## 2022-02-09 NOTE — PATIENT INSTRUCTIONS
Please take a daily claritin/zyrtec - generic OK    OK to continue benadryl at night as needed    Use triamcinolone cream to affected areas twice or three times daily    If not improving by Friday evening/Sat morning please take prednisone

## 2022-02-09 NOTE — PROGRESS NOTES
Assessment/Plan:    Neida Rock is a 46 year old female presenting for:    Dermatitis  She will been taking a daily Claritin.  Triamcinolone cream sent to the pharmacy which she can use 3 times daily to the affected areas.  If this is not beneficial in the next 2 to 3 days or if the rash continues to spread would recommend that she take prednisone.  Discussed risks and benefits of the medication.  - triamcinolone (KENALOG) 0.1 % external lotion  Dispense: 60 mL; Refill: 1  - predniSONE (DELTASONE) 20 MG tablet  Dispense: 10 tablet; Refill: 0      Medications Discontinued During This Encounter   Medication Reason     levothyroxine (SYNTHROID, LEVOTHROID) 100 MCG tablet            Chief Complaint:  Derm Problem        Subjective:   Neida Rock is a pleasant 46-year-old female presenting to the clinic today for concerns over a rash.    Patient notes that she had a rash started in her groin area bilaterally about 5 days ago.  She cannot think of any new contacts or foods.    The rash is very itchy.  It is worse at night.  Anterior upper thighs bilaterally and now it is bilaterally her inner upper arms as well.    No one else at home has similar.  Has tried some hydrocortisone cream on the area which has not been that helpful.    Has been taking occasional Benadryl and she is unsure if that is been helpful either.    12 point review of systems completed and negative except for what has been described above    History   Smoking Status     Never Smoker   Smokeless Tobacco     Never Used         Current Outpatient Medications:      levothyroxine (SYNTHROID/LEVOTHROID) 112 MCG tablet, Take 112 mcg by mouth, Disp: , Rfl:      minocycline (MINOCIN) 100 MG capsule, Take 100 mg by mouth daily PRN, Disp: , Rfl:      predniSONE (DELTASONE) 20 MG tablet, Take 1 tablet (20 mg) by mouth 2 times daily for 5 days, Disp: 10 tablet, Rfl: 0     triamcinolone (KENALOG) 0.1 % external lotion, Apply topically 3 times daily, Disp: 60 mL, Rfl:  "1      Objective:  Vitals:    02/09/22 1410   BP: 110/80   Pulse: 102   Resp: 16   Temp: 98.4  F (36.9  C)   TempSrc: Tympanic   SpO2: 99%   Weight: 68.2 kg (150 lb 4 oz)   Height: 1.549 m (5' 1\")       Body mass index is 28.39 kg/m .    Vital signs reviewed and stable  General: No acute distress  Psych: Appropriate affect  HEENT: moist mucous membranes, pupils equal, round, reactive to light and accomodation, tympanic membranes are pearly grey bilaterally  Lymph: no cervical or supraclavicular lymphadenopathy  Cardiovascular: regular rate and rhythm with no murmur  Pulmonary: clear to auscultation bilaterally with no wheeze  Abdomen: soft, non tender, non distended with normo-active bowel sounds  Extremities: warm and well perfused with no edema  Skin: warm and dry with no rash         This note has been dictated and transcribed using voice recognition software.   Any errors in transcription are unintentional and inherent to the software.  "

## 2022-04-18 ENCOUNTER — MYC MEDICAL ADVICE (OUTPATIENT)
Dept: FAMILY MEDICINE | Facility: CLINIC | Age: 47
End: 2022-04-18
Payer: COMMERCIAL

## 2022-04-18 DIAGNOSIS — L70.9 ACNE, UNSPECIFIED ACNE TYPE: Primary | ICD-10-CM

## 2022-04-19 RX ORDER — MINOCYCLINE HYDROCHLORIDE 100 MG/1
100 CAPSULE ORAL DAILY
Qty: 30 CAPSULE | Refills: 0 | Status: SHIPPED | OUTPATIENT
Start: 2022-04-19 | End: 2022-08-13

## 2022-07-16 ENCOUNTER — HEALTH MAINTENANCE LETTER (OUTPATIENT)
Age: 47
End: 2022-07-16

## 2022-08-13 ENCOUNTER — MYC REFILL (OUTPATIENT)
Dept: FAMILY MEDICINE | Facility: CLINIC | Age: 47
End: 2022-08-13

## 2022-08-13 DIAGNOSIS — L70.9 ACNE, UNSPECIFIED ACNE TYPE: ICD-10-CM

## 2022-08-15 RX ORDER — MINOCYCLINE HYDROCHLORIDE 100 MG/1
100 CAPSULE ORAL DAILY
Qty: 30 CAPSULE | Refills: 0 | Status: SHIPPED | OUTPATIENT
Start: 2022-08-15 | End: 2023-04-05

## 2022-08-15 NOTE — TELEPHONE ENCOUNTER
Routing refill request to provider for review/approval because:  Provider to determine refill     Trav Ramirez RN

## 2022-09-09 ENCOUNTER — OFFICE VISIT (OUTPATIENT)
Dept: FAMILY MEDICINE | Facility: CLINIC | Age: 47
End: 2022-09-09
Payer: COMMERCIAL

## 2022-09-09 VITALS
BODY MASS INDEX: 27.72 KG/M2 | SYSTOLIC BLOOD PRESSURE: 114 MMHG | DIASTOLIC BLOOD PRESSURE: 76 MMHG | HEIGHT: 61 IN | TEMPERATURE: 97.7 F | WEIGHT: 146.8 LBS | OXYGEN SATURATION: 99 % | HEART RATE: 84 BPM

## 2022-09-09 DIAGNOSIS — L70.9 ACNE, UNSPECIFIED ACNE TYPE: ICD-10-CM

## 2022-09-09 DIAGNOSIS — Z12.31 VISIT FOR SCREENING MAMMOGRAM: ICD-10-CM

## 2022-09-09 DIAGNOSIS — Z00.00 HEALTHCARE MAINTENANCE: Primary | ICD-10-CM

## 2022-09-09 DIAGNOSIS — E03.9 HYPOTHYROIDISM, UNSPECIFIED TYPE: ICD-10-CM

## 2022-09-09 LAB
ALBUMIN SERPL BCG-MCNC: 4.3 G/DL (ref 3.5–5.2)
ALP SERPL-CCNC: 72 U/L (ref 35–104)
ALT SERPL W P-5'-P-CCNC: 34 U/L (ref 10–35)
ANION GAP SERPL CALCULATED.3IONS-SCNC: 10 MMOL/L (ref 7–15)
AST SERPL W P-5'-P-CCNC: 27 U/L (ref 10–35)
BILIRUB SERPL-MCNC: 0.6 MG/DL
BUN SERPL-MCNC: 10.1 MG/DL (ref 6–20)
CALCIUM SERPL-MCNC: 9 MG/DL (ref 8.6–10)
CHLORIDE SERPL-SCNC: 104 MMOL/L (ref 98–107)
CHOLEST SERPL-MCNC: 202 MG/DL
CREAT SERPL-MCNC: 0.59 MG/DL (ref 0.51–0.95)
DEPRECATED HCO3 PLAS-SCNC: 26 MMOL/L (ref 22–29)
ERYTHROCYTE [DISTWIDTH] IN BLOOD BY AUTOMATED COUNT: 12.4 % (ref 10–15)
GFR SERPL CREATININE-BSD FRML MDRD: >90 ML/MIN/1.73M2
GLUCOSE SERPL-MCNC: 118 MG/DL (ref 70–99)
HBA1C MFR BLD: 5.9 % (ref 0–5.6)
HCT VFR BLD AUTO: 40.6 % (ref 35–47)
HDLC SERPL-MCNC: 35 MG/DL
HGB BLD-MCNC: 12.9 G/DL (ref 11.7–15.7)
LDLC SERPL CALC-MCNC: 112 MG/DL
MCH RBC QN AUTO: 30.7 PG (ref 26.5–33)
MCHC RBC AUTO-ENTMCNC: 31.8 G/DL (ref 31.5–36.5)
MCV RBC AUTO: 97 FL (ref 78–100)
NONHDLC SERPL-MCNC: 167 MG/DL
PLATELET # BLD AUTO: 204 10E3/UL (ref 150–450)
POTASSIUM SERPL-SCNC: 3.8 MMOL/L (ref 3.4–5.3)
PROT SERPL-MCNC: 7.2 G/DL (ref 6.4–8.3)
RBC # BLD AUTO: 4.2 10E6/UL (ref 3.8–5.2)
SODIUM SERPL-SCNC: 140 MMOL/L (ref 136–145)
T4 FREE SERPL-MCNC: 1.66 NG/DL (ref 0.9–1.7)
TRIGL SERPL-MCNC: 276 MG/DL
TSH SERPL DL<=0.005 MIU/L-ACNC: 0.02 UIU/ML (ref 0.3–4.2)
WBC # BLD AUTO: 6.5 10E3/UL (ref 4–11)

## 2022-09-09 PROCEDURE — 36415 COLL VENOUS BLD VENIPUNCTURE: CPT | Performed by: FAMILY MEDICINE

## 2022-09-09 PROCEDURE — 90686 IIV4 VACC NO PRSV 0.5 ML IM: CPT | Performed by: FAMILY MEDICINE

## 2022-09-09 PROCEDURE — 99396 PREV VISIT EST AGE 40-64: CPT | Mod: 25 | Performed by: FAMILY MEDICINE

## 2022-09-09 PROCEDURE — G0145 SCR C/V CYTO,THINLAYER,RESCR: HCPCS | Performed by: FAMILY MEDICINE

## 2022-09-09 PROCEDURE — 85027 COMPLETE CBC AUTOMATED: CPT | Performed by: FAMILY MEDICINE

## 2022-09-09 PROCEDURE — 84439 ASSAY OF FREE THYROXINE: CPT | Performed by: FAMILY MEDICINE

## 2022-09-09 PROCEDURE — 90471 IMMUNIZATION ADMIN: CPT | Performed by: FAMILY MEDICINE

## 2022-09-09 PROCEDURE — 80053 COMPREHEN METABOLIC PANEL: CPT | Performed by: FAMILY MEDICINE

## 2022-09-09 PROCEDURE — 83036 HEMOGLOBIN GLYCOSYLATED A1C: CPT | Performed by: FAMILY MEDICINE

## 2022-09-09 PROCEDURE — 80061 LIPID PANEL: CPT | Performed by: FAMILY MEDICINE

## 2022-09-09 PROCEDURE — 84443 ASSAY THYROID STIM HORMONE: CPT | Performed by: FAMILY MEDICINE

## 2022-09-09 PROCEDURE — 87624 HPV HI-RISK TYP POOLED RSLT: CPT | Performed by: FAMILY MEDICINE

## 2022-09-09 RX ORDER — MULTIVITAMIN
TABLET ORAL DAILY
COMMUNITY

## 2022-09-09 RX ORDER — CLINDAMYCIN PHOSPHATE 10 UG/ML
LOTION TOPICAL 2 TIMES DAILY
Qty: 60 ML | Refills: 3 | Status: SHIPPED | OUTPATIENT
Start: 2022-09-09 | End: 2022-09-09

## 2022-09-09 RX ORDER — CLINDAMYCIN PHOSPHATE 10 UG/ML
LOTION TOPICAL 2 TIMES DAILY
Qty: 60 ML | Refills: 3 | Status: SHIPPED | OUTPATIENT
Start: 2022-09-09 | End: 2023-05-26

## 2022-09-09 ASSESSMENT — ENCOUNTER SYMPTOMS
NERVOUS/ANXIOUS: 0
SORE THROAT: 0
DIZZINESS: 0
HEARTBURN: 0
PALPITATIONS: 0
HEADACHES: 0
NAUSEA: 0
COUGH: 0
ABDOMINAL PAIN: 0
FEVER: 0
DYSURIA: 0
CONSTIPATION: 0
BREAST MASS: 0
WEAKNESS: 0
HEMATOCHEZIA: 0
HEMATURIA: 0
CHILLS: 0
PARESTHESIAS: 0
FREQUENCY: 0
ARTHRALGIAS: 0
DIARRHEA: 0
SHORTNESS OF BREATH: 0
EYE PAIN: 0
JOINT SWELLING: 0
MYALGIAS: 0

## 2022-09-09 NOTE — PROGRESS NOTES
SUBJECTIVE:   CC: Neida Rock is an 46 year old woman who presents for preventive health visit.       Patient has been advised of split billing requirements and indicates understanding: Yes  Healthy Habits:     Getting at least 3 servings of Calcium per day:  Yes    Bi-annual eye exam:  Yes    Dental care twice a year:  Yes    Sleep apnea or symptoms of sleep apnea:  None    Diet:  Regular (no restrictions)    Frequency of exercise:  2-3 days/week    Duration of exercise:  15-30 minutes    Taking medications regularly:  Yes    Medication side effects:  None    PHQ-2 Total Score: 0    Additional concerns today:  No      Today's PHQ-2 Score:   PHQ-2 ( 1999 [employer]) 9/9/2022   Q1: Little interest or pleasure in doing things 0   Q2: Feeling down, depressed or hopeless 0   PHQ-2 Score 0   Q1: Little interest or pleasure in doing things Not at all   Q2: Feeling down, depressed or hopeless Not at all   PHQ-2 Score 0       Abuse: Current or Past (Physical, Sexual or Emotional) - No  Do you feel safe in your environment? Yes    Have you ever done Advance Care Planning? (For example, a Health Directive, POLST, or a discussion with a medical provider or your loved ones about your wishes): No, advance care planning information given to patient to review.  Patient plans to discuss their wishes with loved ones or provider.      Social History     Tobacco Use     Smoking status: Never Smoker     Smokeless tobacco: Never Used   Substance Use Topics     Alcohol use: No         Alcohol Use 9/9/2022   Prescreen: >3 drinks/day or >7 drinks/week? No       Reviewed orders with patient.  Reviewed health maintenance and updated orders accordingly - Yes  Labs reviewed in EPIC    Breast Cancer Screening:    Breast CA Risk Assessment (FHS-7) 9/9/2022   Do you have a family history of breast, colon, or ovarian cancer? No / Unknown         Mammogram Screening - Offered annual screening and updated Health Maintenance for mutual plan based on  "risk factor consideration    Pertinent mammograms are reviewed under the imaging tab.    History of abnormal Pap smear: NO - age 30-65 PAP every 5 years with negative HPV co-testing recommended  PAP / HPV 12/16/2016   HPV See Scanned Report     Reviewed and updated as needed this visit by clinical staff   Tobacco  Allergies  Meds                Reviewed and updated as needed this visit by Provider                       Review of Systems   Constitutional: Negative for chills and fever.   HENT: Negative for congestion, ear pain, hearing loss and sore throat.    Eyes: Negative for pain and visual disturbance.   Respiratory: Negative for cough and shortness of breath.    Cardiovascular: Negative for chest pain, palpitations and peripheral edema.   Gastrointestinal: Negative for abdominal pain, constipation, diarrhea, heartburn, hematochezia and nausea.   Breasts:  Negative for tenderness, breast mass and discharge.   Genitourinary: Negative for dysuria, frequency, genital sores, hematuria, pelvic pain, urgency, vaginal bleeding and vaginal discharge.   Musculoskeletal: Negative for arthralgias, joint swelling and myalgias.   Skin: Negative for rash.   Neurological: Negative for dizziness, weakness, headaches and paresthesias.   Psychiatric/Behavioral: Negative for mood changes. The patient is not nervous/anxious.           OBJECTIVE:   /76   Pulse 84   Temp 97.7  F (36.5  C) (Tympanic)   Ht 1.537 m (5' 0.5\")   Wt 66.6 kg (146 lb 12.8 oz)   SpO2 99%   Breastfeeding No   BMI 28.20 kg/m    Physical Exam    Physical Exam:  General Appearance: Alert, cooperative, no distress, appears stated age   Head: Normocephalic, without obvious abnormality, atraumatic  Eyes: PERRL, conjunctiva/corneas clear, EOM's intact   Ears: Normal TM's and external ear canals, both ears  Neck: Supple, symmetrical, trachea midline, no adenopathy;  thyroid: not enlarged, symmetric, no tenderness/mass/nodules  Back: Symmetric, no " curvature, ROM normal,  Lungs: Clear to auscultation bilaterally, respirations unlabored  Breasts: No breast masses, tenderness, asymmetry, or nipple discharge.  Heart: Regular rate and rhythm, S1 and S2 normal, no murmur, rub, or gallop  Abdomen: Soft, non-tender, bowel sounds active all four quadrants,  no masses, no organomegaly  Pelvic:normal external female genitalia, normal appearing vaginal mucosa and cervix, IUD stings in place  Extremities: Extremities normal, atraumatic, no cyanosis or edema  Skin: Skin color, texture, turgor normal, no rashes or lesions  Lymph nodes: Cervical, supraclavicular, and axillary nodes normal and   Neurologic: Normal        ASSESSMENT/PLAN:   1. Healthcare maintenance   for [employer] - doing well    - REVIEW OF HEALTH MAINTENANCE PROTOCOL ORDERS  - INFLUENZA VACCINE IM > 6 MONTHS VALENT IIV4 (AFLURIA/FLUZONE)  - Lipid panel reflex to direct LDL Non-fasting; Future  - HEMOGLOBIN A1C; Future  - CBC with Platelets; Future  - COMPREHENSIVE METABOLIC PANEL; Future  - REVIEW OF HEALTH MAINTENANCE PROTOCOL ORDERS  - Pap screen with HPV - recommended age 30 - 65 years  - Lipid panel reflex to direct LDL Non-fasting  - HEMOGLOBIN A1C  - CBC with Platelets  - COMPREHENSIVE METABOLIC PANEL    2. Hypothyroidism, unspecified type  Recheck - see endocrine  - TSH with free T4 reflex; Future  - TSH with free T4 reflex  - T4 free    3. Acne, unspecified acne type  Continue oral if needed but add on clindamycin lotion  - clindamycin (CLEOCIN T) 1 % external lotion; Apply topically 2 times daily  Dispense: 60 mL; Refill: 3    4. Visit for screening mammogram  - *MA Screening Digital Bilateral; Future      Patient has been advised of split billing requirements and indicates understanding: Yes    COUNSELING:  Reviewed preventive health counseling, as reflected in patient instructions    Estimated body mass index is 28.2 kg/m  as calculated from the following:    Height as of this encounter:  "1.537 m (5' 0.5\").    Weight as of this encounter: 66.6 kg (146 lb 12.8 oz).    Weight management plan: Discussed healthy diet and exercise guidelines    She reports that she has never smoked. She has never used smokeless tobacco.      Counseling Resources:  ATP IV Guidelines  Pooled Cohorts Equation Calculator  Breast Cancer Risk Calculator  BRCA-Related Cancer Risk Assessment: FHS-7 Tool  FRAX Risk Assessment  ICSI Preventive Guidelines  Dietary Guidelines for Americans, 2010  USDA's MyPlate  ASA Prophylaxis  Lung CA Screening    Zee Nelson MD  [facility]  "

## 2022-09-09 NOTE — NURSING NOTE
"Initial /76   Pulse 84   Temp 97.7  F (36.5  C) (Tympanic)   Ht 1.537 m (5' 0.5\")   Wt 66.6 kg (146 lb 12.8 oz)   SpO2 99%   Breastfeeding No   BMI 28.20 kg/m   Estimated body mass index is 28.2 kg/m  as calculated from the following:    Height as of this encounter: 1.537 m (5' 0.5\").    Weight as of this encounter: 66.6 kg (146 lb 12.8 oz). .      "

## 2022-09-11 RX ORDER — LEVOTHYROXINE SODIUM 100 UG/1
100 TABLET ORAL DAILY
Qty: 90 TABLET | Refills: 0 | Status: SHIPPED | OUTPATIENT
Start: 2022-09-11 | End: 2022-12-27

## 2022-09-13 LAB
BKR LAB AP GYN ADEQUACY: NORMAL
BKR LAB AP GYN INTERPRETATION: NORMAL
BKR LAB AP HPV REFLEX: NORMAL
BKR LAB AP PREVIOUS ABNORMAL: NORMAL
PATH REPORT.COMMENTS IMP SPEC: NORMAL
PATH REPORT.COMMENTS IMP SPEC: NORMAL
PATH REPORT.RELEVANT HX SPEC: NORMAL

## 2022-09-15 LAB
HUMAN PAPILLOMA VIRUS 16 DNA: NEGATIVE
HUMAN PAPILLOMA VIRUS 18 DNA: NEGATIVE
HUMAN PAPILLOMA VIRUS FINAL DIAGNOSIS: NORMAL
HUMAN PAPILLOMA VIRUS OTHER HR: NEGATIVE

## 2022-09-29 ENCOUNTER — ANCILLARY PROCEDURE (OUTPATIENT)
Dept: MAMMOGRAPHY | Facility: CLINIC | Age: 47
End: 2022-09-29
Attending: FAMILY MEDICINE
Payer: COMMERCIAL

## 2022-09-29 DIAGNOSIS — Z12.31 VISIT FOR SCREENING MAMMOGRAM: ICD-10-CM

## 2022-09-29 PROCEDURE — 77067 SCR MAMMO BI INCL CAD: CPT

## 2022-12-27 DIAGNOSIS — E03.9 HYPOTHYROIDISM, UNSPECIFIED TYPE: ICD-10-CM

## 2022-12-27 RX ORDER — LEVOTHYROXINE SODIUM 100 UG/1
TABLET ORAL
Qty: 90 TABLET | Refills: 2 | Status: SHIPPED | OUTPATIENT
Start: 2022-12-27 | End: 2023-07-27

## 2022-12-27 NOTE — TELEPHONE ENCOUNTER
"Requested Prescriptions   Pending Prescriptions Disp Refills     levothyroxine (SYNTHROID/LEVOTHROID) 100 MCG tablet [Pharmacy Med Name: LEVOTHYROXINE SODIUM 100MCG TABS] 90 tablet 0     Sig: TAKE ONE TABLET BY MOUTH ONCE DAILY       Thyroid Protocol Failed - 12/27/2022  9:07 AM        Failed - Normal TSH on file in past 12 months     Recent Labs   Lab Test 09/09/22  1347   TSH 0.02*              Passed - Patient is 12 years or older        Passed - Recent (12 mo) or future (30 days) visit within the authorizing provider's specialty     Patient has had an office visit with the authorizing provider or a provider within the authorizing providers department within the previous 12 mos or has a future within next 30 days. See \"Patient Info\" tab in inbasket, or \"Choose Columns\" in Meds & Orders section of the refill encounter.              Passed - Medication is active on med list        Passed - No active pregnancy on record     If patient is pregnant or has had a positive pregnancy test, please check TSH.          Passed - No positive pregnancy test in past 12 months     If patient is pregnant or has had a positive pregnancy test, please check TSH.               "

## 2023-04-05 DIAGNOSIS — L70.9 ACNE, UNSPECIFIED ACNE TYPE: ICD-10-CM

## 2023-04-05 RX ORDER — MINOCYCLINE HYDROCHLORIDE 100 MG/1
100 CAPSULE ORAL DAILY
Qty: 30 CAPSULE | Refills: 0 | Status: SHIPPED | OUTPATIENT
Start: 2023-04-05 | End: 2023-05-02

## 2023-05-02 DIAGNOSIS — L70.9 ACNE, UNSPECIFIED ACNE TYPE: ICD-10-CM

## 2023-05-02 RX ORDER — MINOCYCLINE HYDROCHLORIDE 100 MG/1
CAPSULE ORAL
Qty: 30 CAPSULE | Refills: 0 | Status: SHIPPED | OUTPATIENT
Start: 2023-05-02 | End: 2023-05-24

## 2023-05-02 NOTE — TELEPHONE ENCOUNTER
Routing refill request to provider for review/approval because:  Confirmation of diagnosis is required.   Kirstin Gaxiola RN on 5/2/2023 at 5:20 PM

## 2023-05-24 ENCOUNTER — VIRTUAL VISIT (OUTPATIENT)
Dept: FAMILY MEDICINE | Facility: CLINIC | Age: 48
End: 2023-05-24
Payer: COMMERCIAL

## 2023-05-24 DIAGNOSIS — F41.9 ANXIETY: Primary | ICD-10-CM

## 2023-05-24 DIAGNOSIS — L70.9 ACNE, UNSPECIFIED ACNE TYPE: ICD-10-CM

## 2023-05-24 PROCEDURE — 99214 OFFICE O/P EST MOD 30 MIN: CPT | Mod: 95 | Performed by: FAMILY MEDICINE

## 2023-05-24 RX ORDER — MINOCYCLINE HYDROCHLORIDE 100 MG/1
CAPSULE ORAL
Qty: 30 CAPSULE | Refills: 0 | Status: SHIPPED | OUTPATIENT
Start: 2023-05-24 | End: 2023-06-28

## 2023-05-24 RX ORDER — HYDROXYZINE HYDROCHLORIDE 25 MG/1
25 TABLET, FILM COATED ORAL 3 TIMES DAILY PRN
Qty: 20 TABLET | Refills: 0 | Status: SHIPPED | OUTPATIENT
Start: 2023-05-24 | End: 2023-12-11

## 2023-05-24 RX ORDER — FLUOXETINE 10 MG/1
10 CAPSULE ORAL DAILY
Qty: 90 CAPSULE | Refills: 1 | Status: SHIPPED | OUTPATIENT
Start: 2023-05-24 | End: 2023-12-11

## 2023-05-24 NOTE — PROGRESS NOTES
Tonya is a 47 year old who is being evaluated via a billable telephone visit.      What phone number would you like to be contacted at? [phone]  How would you like to obtain your AVS? Isidro  {PROVIDER LOCATION On-site should be selected for visits conducted from your clinic location or adjoining [facility] hospital, academic office, or other nearby [facility] building. Off-site should be selected for all other provider locations, including home:[id number]}  Distant Location (provider location):  {virtual location provider:[id number]}    --------------------------------    Assessment/Plan:    Neida Rock is a 47 year old female who is being evaluated remotely for:    Anxiety  ***  - FLUoxetine (PROZAC) 10 MG capsule  Dispense: 90 capsule; Refill: 1  - hydrOXYzine (ATARAX) 25 MG tablet  Dispense: 20 tablet; Refill: 0    Acne, unspecified acne type  ***  - minocycline (MINOCIN) 100 MG capsule  Dispense: 30 capsule; Refill: 0        Medications Discontinued During This Encounter   Medication Reason     minocycline (MINOCIN) 100 MG capsule Reorder (No AVS / No eCancel)           Chief Complaint:  Forms        Subjective:   Neida Rock is a 47 year old female who is being evaluated via telephone visit today for ***    1 year  Yes      12 point review of systems completed and negative except for what has been described above    History   Smoking Status     Never   Smokeless Tobacco     Never         Current Outpatient Medications:      FLUoxetine (PROZAC) 10 MG capsule, Take 1 capsule (10 mg) by mouth daily, Disp: 90 capsule, Rfl: 1     hydrOXYzine (ATARAX) 25 MG tablet, Take 1 tablet (25 mg) by mouth 3 times daily as needed for anxiety, Disp: 20 tablet, Rfl: 0     levothyroxine (SYNTHROID/LEVOTHROID) 100 MCG tablet, TAKE ONE TABLET BY MOUTH ONCE DAILY, Disp: 90 tablet, Rfl: 2     minocycline (MINOCIN) 100 MG capsule, TAKE 1 CAPSULE (100 MG) BY MOUTH DAILY AS NEEDED, Disp: 30 capsule, Rfl: 0     Multiple vitamin TABS, Take by mouth daily,  Disp: , Rfl:      clindamycin (CLEOCIN T) 1 % external lotion, Apply topically 2 times daily (Patient not taking: Reported on 5/24/2023), Disp: 60 mL, Rfl: 3     clindamycin (CLEOCIN T) 1 % external solution, Apply topically 2 times daily (Patient not taking: Reported on 5/24/2023), Disp: 60 mL, Rfl: 1     levothyroxine (SYNTHROID/LEVOTHROID) 112 MCG tablet, Take 112 mcg by mouth daily (Patient not taking: Reported on 5/24/2023), Disp: , Rfl:         Objective:  No vitals were done due to the remote nature of this visit         View : No data to display.                          General: No acute distress, sounds well  Psych: Appropriate affect, pleasant  Pulmonary: Breathing comfortably, speaking in complete sentences     This note has been dictated and transcribed using voice recognition software.   Any errors in transcription are unintentional and inherent to the software.       Phone call duration: *** minutes

## 2023-05-24 NOTE — PROGRESS NOTES
"Tonya is a 47 year old who is being evaluated via a billable telephone visit.      What phone number would you like to be contacted at? [phone]  How would you like to obtain your AVS? Isidro    Distant Location (provider location):  On-site    --------------------------------    Assessment/Plan:    Neida Rock is a 47 year old female who is being evaluated remotely for:    Anxiety  Discussed risk and benefits of the medication.  Prozac sent to the pharmacy starting at 10 mg daily.  Hydroxyzine sent as well.    Letter placed in her chart.  - FLUoxetine (PROZAC) 10 MG capsule  Dispense: 90 capsule; Refill: 1  - hydrOXYzine (ATARAX) 25 MG tablet  Dispense: 20 tablet; Refill: 0    Acne, unspecified acne type  Refill - uses occasionally  - minocycline (MINOCIN) 100 MG capsule  Dispense: 30 capsule; Refill: 0        Medications Discontinued During This Encounter   Medication Reason     minocycline (MINOCIN) 100 MG capsule Reorder (No AVS / No eCancel)           Chief Complaint:  Forms        Subjective:   Neida Rock is a 47 year old female who is being evaluated via telephone visit today for discussion of anxiety and forms.    The patient has unfortunately been suffering from fairly significant anxiety.  There have been several things in her life that have been going on that have increased this anxiety including her father being in court as well as some issues with her children.    She states that she works from home and has been doing fairly well with this.  Her company has been encouraging them to get back to work.  She talked with her supervisor about continuing to work from home which they are reportedly fine with but need a letter.    She notes that the drive to work is very stressful for her.  Being at work sometimes can cause panic episodes which has been very difficult for her.    She notes that when she works from home she feels as though she is more \"centered\".    She also wonders about medication to " potentially help with her issues.  She is considering potentially speaking with a therapist as well.  She has not been on medication for mood in the past.    12 point review of systems completed and negative except for what has been described above    History   Smoking Status     Never   Smokeless Tobacco     Never         Current Outpatient Medications:      FLUoxetine (PROZAC) 10 MG capsule, Take 1 capsule (10 mg) by mouth daily, Disp: 90 capsule, Rfl: 1     hydrOXYzine (ATARAX) 25 MG tablet, Take 1 tablet (25 mg) by mouth 3 times daily as needed for anxiety, Disp: 20 tablet, Rfl: 0     levothyroxine (SYNTHROID/LEVOTHROID) 100 MCG tablet, TAKE ONE TABLET BY MOUTH ONCE DAILY, Disp: 90 tablet, Rfl: 2     minocycline (MINOCIN) 100 MG capsule, TAKE 1 CAPSULE (100 MG) BY MOUTH DAILY AS NEEDED, Disp: 30 capsule, Rfl: 0     Multiple vitamin TABS, Take by mouth daily, Disp: , Rfl:      clindamycin (CLEOCIN T) 1 % external lotion, Apply topically 2 times daily (Patient not taking: Reported on 5/24/2023), Disp: 60 mL, Rfl: 3     clindamycin (CLEOCIN T) 1 % external solution, Apply topically 2 times daily (Patient not taking: Reported on 5/24/2023), Disp: 60 mL, Rfl: 1     levothyroxine (SYNTHROID/LEVOTHROID) 112 MCG tablet, Take 112 mcg by mouth daily (Patient not taking: Reported on 5/24/2023), Disp: , Rfl:         Objective:  No vitals were done due to the remote nature of this visit         View : No data to display.                          General: No acute distress, sounds well  Psych: Appropriate affect, pleasant  Pulmonary: Breathing comfortably, speaking in complete sentences     This note has been dictated and transcribed using voice recognition software.   Any errors in transcription are unintentional and inherent to the software.       Phone call duration: 18 minutes

## 2023-06-28 DIAGNOSIS — L70.9 ACNE, UNSPECIFIED ACNE TYPE: ICD-10-CM

## 2023-06-28 RX ORDER — MINOCYCLINE HYDROCHLORIDE 100 MG/1
CAPSULE ORAL
Qty: 30 CAPSULE | Refills: 0 | Status: SHIPPED | OUTPATIENT
Start: 2023-06-28 | End: 2023-08-02

## 2023-06-28 NOTE — TELEPHONE ENCOUNTER
"Routing refill request to provider for review/approval because:  PCP to determine refill         Requested Prescriptions   Pending Prescriptions Disp Refills     minocycline (MINOCIN) 100 MG capsule [Pharmacy Med Name: MINOCYCLINE 100 MG CAPSULE] 30 capsule 0     Sig: TAKE 1 CAPSULE BY MOUTH EVERY DAY AS NEEDED       Oral Acne/Rosacea Medications Protocol Failed - 6/28/2023 12:34 AM        Failed - Confirmation of diagnosis is required     Please confirm diagnosis is acne or rosacea.     If NOT acne or rosacea; refer request to provider for further evaluation.    If diagnosis IS acne or rosacea, OK to refill BASED ON PREVIOUS REFILL CLINICAL NOTE RECOMMENDATION.          Passed - Patient is 12 years of age or older        Passed - Recent (12 mo) or future (30 days) visit within the authorizing provider's specialty     Patient has had an office visit with the authorizing provider or a provider within the authorizing providers department within the previous 12 mos or has a future within next 30 days. See \"Patient Info\" tab in inbasket, or \"Choose Columns\" in Meds & Orders section of the refill encounter.              Passed - Medication is active on med list        Passed - No active pregnancy on record        Passed - No positive prenancy test is past 12 months                 Trav Ramirez RN 06/28/23 10:58 AM  "

## 2023-07-27 DIAGNOSIS — E03.9 HYPOTHYROIDISM, UNSPECIFIED TYPE: ICD-10-CM

## 2023-07-27 RX ORDER — LEVOTHYROXINE SODIUM 100 UG/1
100 TABLET ORAL DAILY
Qty: 90 TABLET | Refills: 0 | Status: SHIPPED | OUTPATIENT
Start: 2023-07-27 | End: 2023-09-13

## 2023-07-27 NOTE — TELEPHONE ENCOUNTER
"Routing refill request to provider for review/approval because:  Labs out of range: TSH          Requested Prescriptions   Pending Prescriptions Disp Refills    levothyroxine (SYNTHROID/LEVOTHROID) 100 MCG tablet 90 tablet 2     Sig: Take 1 tablet (100 mcg) by mouth daily       Thyroid Protocol Failed - 7/27/2023  2:13 PM        Failed - Normal TSH on file in past 12 months     Recent Labs   Lab Test 09/09/22  1347   TSH 0.02*              Passed - Patient is 12 years or older        Passed - Recent (12 mo) or future (30 days) visit within the authorizing provider's specialty     Patient has had an office visit with the authorizing provider or a provider within the authorizing providers department within the previous 12 mos or has a future within next 30 days. See \"Patient Info\" tab in inbasket, or \"Choose Columns\" in Meds & Orders section of the refill encounter.              Passed - Medication is active on med list        Passed - No active pregnancy on record     If patient is pregnant or has had a positive pregnancy test, please check TSH.          Passed - No positive pregnancy test in past 12 months     If patient is pregnant or has had a positive pregnancy test, please check TSH.                   Kirstin Gaxiola RN 07/27/23 2:19 PM   "

## 2023-08-02 DIAGNOSIS — L70.9 ACNE, UNSPECIFIED ACNE TYPE: ICD-10-CM

## 2023-08-02 RX ORDER — MINOCYCLINE HYDROCHLORIDE 100 MG/1
CAPSULE ORAL
Qty: 30 CAPSULE | Refills: 0 | Status: SHIPPED | OUTPATIENT
Start: 2023-08-02 | End: 2023-09-01

## 2023-08-02 NOTE — TELEPHONE ENCOUNTER
"Routing refill request to provider for review/approval because:  Failed protocol.                Requested Prescriptions   Pending Prescriptions Disp Refills    minocycline (MINOCIN) 100 MG capsule [Pharmacy Med Name: MINOCYCLINE 100 MG CAPSULE] 30 capsule 0     Sig: TAKE 1 CAPSULE BY MOUTH EVERY DAY AS NEEDED       Oral Acne/Rosacea Medications Protocol Failed - 8/2/2023 11:06 AM        Failed - Confirmation of diagnosis is required     Please confirm diagnosis is acne or rosacea.     If NOT acne or rosacea; refer request to provider for further evaluation.    If diagnosis IS acne or rosacea, OK to refill BASED ON PREVIOUS REFILL CLINICAL NOTE RECOMMENDATION.          Passed - Patient is 12 years of age or older        Passed - Recent (12 mo) or future (30 days) visit within the authorizing provider's specialty     Patient has had an office visit with the authorizing provider or a provider within the authorizing providers department within the previous 12 mos or has a future within next 30 days. See \"Patient Info\" tab in inbasket, or \"Choose Columns\" in Meds & Orders section of the refill encounter.              Passed - Medication is active on med list        Passed - No active pregnancy on record        Passed - No positive prenancy test is past 12 months                 Lucho Rodriges RN 08/02/23 11:30 AM   "

## 2023-08-10 ENCOUNTER — PATIENT OUTREACH (OUTPATIENT)
Dept: CARE COORDINATION | Facility: CLINIC | Age: 48
End: 2023-08-10
Payer: COMMERCIAL

## 2023-08-24 ENCOUNTER — PATIENT OUTREACH (OUTPATIENT)
Dept: CARE COORDINATION | Facility: CLINIC | Age: 48
End: 2023-08-24
Payer: COMMERCIAL

## 2023-08-30 ENCOUNTER — PATIENT OUTREACH (OUTPATIENT)
Dept: CARE COORDINATION | Facility: CLINIC | Age: 48
End: 2023-08-30
Payer: COMMERCIAL

## 2023-09-01 DIAGNOSIS — L70.9 ACNE, UNSPECIFIED ACNE TYPE: ICD-10-CM

## 2023-09-01 RX ORDER — MINOCYCLINE HYDROCHLORIDE 100 MG/1
CAPSULE ORAL
Qty: 30 CAPSULE | Refills: 0 | Status: SHIPPED | OUTPATIENT
Start: 2023-09-01 | End: 2023-10-04

## 2023-09-12 DIAGNOSIS — E03.9 HYPOTHYROIDISM, UNSPECIFIED TYPE: ICD-10-CM

## 2023-09-13 RX ORDER — LEVOTHYROXINE SODIUM 100 UG/1
100 TABLET ORAL DAILY
Qty: 90 TABLET | Refills: 2 | Status: SHIPPED | OUTPATIENT
Start: 2023-09-13 | End: 2024-06-03

## 2023-09-27 ENCOUNTER — PATIENT OUTREACH (OUTPATIENT)
Dept: CARE COORDINATION | Facility: CLINIC | Age: 48
End: 2023-09-27
Payer: COMMERCIAL

## 2023-10-04 DIAGNOSIS — L70.9 ACNE, UNSPECIFIED ACNE TYPE: ICD-10-CM

## 2023-10-04 RX ORDER — MINOCYCLINE HYDROCHLORIDE 100 MG/1
CAPSULE ORAL
Qty: 30 CAPSULE | Refills: 0 | Status: SHIPPED | OUTPATIENT
Start: 2023-10-04 | End: 2023-11-02

## 2023-11-02 DIAGNOSIS — L70.9 ACNE, UNSPECIFIED ACNE TYPE: ICD-10-CM

## 2023-11-02 RX ORDER — MINOCYCLINE HYDROCHLORIDE 100 MG/1
CAPSULE ORAL
Qty: 30 CAPSULE | Refills: 0 | Status: SHIPPED | OUTPATIENT
Start: 2023-11-02 | End: 2024-05-22

## 2023-11-16 ENCOUNTER — ANCILLARY PROCEDURE (OUTPATIENT)
Dept: MAMMOGRAPHY | Facility: CLINIC | Age: 48
End: 2023-11-16
Attending: FAMILY MEDICINE
Payer: COMMERCIAL

## 2023-11-16 DIAGNOSIS — Z12.31 VISIT FOR SCREENING MAMMOGRAM: ICD-10-CM

## 2023-11-16 PROCEDURE — 77067 SCR MAMMO BI INCL CAD: CPT

## 2023-12-11 ENCOUNTER — OFFICE VISIT (OUTPATIENT)
Dept: FAMILY MEDICINE | Facility: CLINIC | Age: 48
End: 2023-12-11
Payer: COMMERCIAL

## 2023-12-11 VITALS
TEMPERATURE: 97.1 F | DIASTOLIC BLOOD PRESSURE: 66 MMHG | OXYGEN SATURATION: 100 % | HEART RATE: 74 BPM | BODY MASS INDEX: 28.24 KG/M2 | SYSTOLIC BLOOD PRESSURE: 122 MMHG | WEIGHT: 147 LBS

## 2023-12-11 DIAGNOSIS — Z12.11 SCREEN FOR COLON CANCER: ICD-10-CM

## 2023-12-11 DIAGNOSIS — E03.9 HYPOTHYROIDISM, UNSPECIFIED TYPE: ICD-10-CM

## 2023-12-11 DIAGNOSIS — R53.83 OTHER FATIGUE: ICD-10-CM

## 2023-12-11 DIAGNOSIS — Z13.6 CARDIOVASCULAR SCREENING; LDL GOAL LESS THAN 160: ICD-10-CM

## 2023-12-11 DIAGNOSIS — Z00.00 ROUTINE GENERAL MEDICAL EXAMINATION AT A HEALTH CARE FACILITY: Primary | ICD-10-CM

## 2023-12-11 DIAGNOSIS — Z11.4 SCREENING FOR HIV (HUMAN IMMUNODEFICIENCY VIRUS): ICD-10-CM

## 2023-12-11 LAB
ALBUMIN SERPL BCG-MCNC: 4.5 G/DL (ref 3.5–5.2)
ALP SERPL-CCNC: 84 U/L (ref 40–150)
ALT SERPL W P-5'-P-CCNC: 25 U/L (ref 0–50)
ANION GAP SERPL CALCULATED.3IONS-SCNC: 8 MMOL/L (ref 7–15)
AST SERPL W P-5'-P-CCNC: 24 U/L (ref 0–45)
BASOPHILS # BLD AUTO: 0 10E3/UL (ref 0–0.2)
BASOPHILS NFR BLD AUTO: 0 %
BILIRUB SERPL-MCNC: 0.5 MG/DL
BUN SERPL-MCNC: 8.6 MG/DL (ref 6–20)
CALCIUM SERPL-MCNC: 9.6 MG/DL (ref 8.6–10)
CHLORIDE SERPL-SCNC: 105 MMOL/L (ref 98–107)
CHOLEST SERPL-MCNC: 223 MG/DL
CREAT SERPL-MCNC: 0.6 MG/DL (ref 0.51–0.95)
DEPRECATED HCO3 PLAS-SCNC: 25 MMOL/L (ref 22–29)
EGFRCR SERPLBLD CKD-EPI 2021: >90 ML/MIN/1.73M2
EOSINOPHIL # BLD AUTO: 0.1 10E3/UL (ref 0–0.7)
EOSINOPHIL NFR BLD AUTO: 2 %
ERYTHROCYTE [DISTWIDTH] IN BLOOD BY AUTOMATED COUNT: 12.7 % (ref 10–15)
FASTING STATUS PATIENT QL REPORTED: YES
FERRITIN SERPL-MCNC: 294 NG/ML (ref 6–175)
GLUCOSE SERPL-MCNC: 98 MG/DL (ref 70–99)
HBA1C MFR BLD: 6.2 % (ref 0–5.6)
HCT VFR BLD AUTO: 41.1 % (ref 35–47)
HDLC SERPL-MCNC: 42 MG/DL
HGB BLD-MCNC: 13.4 G/DL (ref 11.7–15.7)
IMM GRANULOCYTES # BLD: 0 10E3/UL
IMM GRANULOCYTES NFR BLD: 0 %
LDLC SERPL CALC-MCNC: 136 MG/DL
LYMPHOCYTES # BLD AUTO: 2.5 10E3/UL (ref 0.8–5.3)
LYMPHOCYTES NFR BLD AUTO: 30 %
MCH RBC QN AUTO: 30.7 PG (ref 26.5–33)
MCHC RBC AUTO-ENTMCNC: 32.6 G/DL (ref 31.5–36.5)
MCV RBC AUTO: 94 FL (ref 78–100)
MONOCYTES # BLD AUTO: 0.6 10E3/UL (ref 0–1.3)
MONOCYTES NFR BLD AUTO: 7 %
NEUTROPHILS # BLD AUTO: 5.2 10E3/UL (ref 1.6–8.3)
NEUTROPHILS NFR BLD AUTO: 61 %
NONHDLC SERPL-MCNC: 181 MG/DL
PLATELET # BLD AUTO: 215 10E3/UL (ref 150–450)
POTASSIUM SERPL-SCNC: 4.8 MMOL/L (ref 3.4–5.3)
PROT SERPL-MCNC: 7.5 G/DL (ref 6.4–8.3)
RBC # BLD AUTO: 4.37 10E6/UL (ref 3.8–5.2)
SODIUM SERPL-SCNC: 138 MMOL/L (ref 135–145)
TRIGL SERPL-MCNC: 223 MG/DL
TSH SERPL DL<=0.005 MIU/L-ACNC: 0.62 UIU/ML (ref 0.3–4.2)
WBC # BLD AUTO: 8.5 10E3/UL (ref 4–11)

## 2023-12-11 PROCEDURE — 90480 ADMN SARSCOV2 VAC 1/ONLY CMP: CPT | Performed by: PHYSICIAN ASSISTANT

## 2023-12-11 PROCEDURE — 85025 COMPLETE CBC W/AUTO DIFF WBC: CPT | Performed by: PHYSICIAN ASSISTANT

## 2023-12-11 PROCEDURE — 87389 HIV-1 AG W/HIV-1&-2 AB AG IA: CPT | Performed by: PHYSICIAN ASSISTANT

## 2023-12-11 PROCEDURE — 82728 ASSAY OF FERRITIN: CPT | Performed by: PHYSICIAN ASSISTANT

## 2023-12-11 PROCEDURE — 83036 HEMOGLOBIN GLYCOSYLATED A1C: CPT | Performed by: PHYSICIAN ASSISTANT

## 2023-12-11 PROCEDURE — 99396 PREV VISIT EST AGE 40-64: CPT | Mod: 25 | Performed by: PHYSICIAN ASSISTANT

## 2023-12-11 PROCEDURE — 84443 ASSAY THYROID STIM HORMONE: CPT | Performed by: PHYSICIAN ASSISTANT

## 2023-12-11 PROCEDURE — 80053 COMPREHEN METABOLIC PANEL: CPT | Performed by: PHYSICIAN ASSISTANT

## 2023-12-11 PROCEDURE — 80061 LIPID PANEL: CPT | Performed by: PHYSICIAN ASSISTANT

## 2023-12-11 PROCEDURE — 36415 COLL VENOUS BLD VENIPUNCTURE: CPT | Performed by: PHYSICIAN ASSISTANT

## 2023-12-11 PROCEDURE — 90686 IIV4 VACC NO PRSV 0.5 ML IM: CPT | Performed by: PHYSICIAN ASSISTANT

## 2023-12-11 PROCEDURE — 91320 SARSCV2 VAC 30MCG TRS-SUC IM: CPT | Performed by: PHYSICIAN ASSISTANT

## 2023-12-11 PROCEDURE — 90471 IMMUNIZATION ADMIN: CPT | Performed by: PHYSICIAN ASSISTANT

## 2023-12-11 ASSESSMENT — ENCOUNTER SYMPTOMS
COUGH: 0
MYALGIAS: 0
SORE THROAT: 0
FEVER: 0
NAUSEA: 0
CONSTIPATION: 0
NERVOUS/ANXIOUS: 0
EYE PAIN: 0
SHORTNESS OF BREATH: 0
FREQUENCY: 0
PARESTHESIAS: 0
PALPITATIONS: 0
HEADACHES: 0
BREAST MASS: 0
HEMATOCHEZIA: 0
HEMATURIA: 0
ARTHRALGIAS: 0
HEARTBURN: 0
DIARRHEA: 0
CHILLS: 0
WEAKNESS: 0
DIZZINESS: 0
DYSURIA: 0
ABDOMINAL PAIN: 0
JOINT SWELLING: 0

## 2023-12-11 NOTE — PROGRESS NOTES
SUBJECTIVE:   Tonya is a 48 year old, presenting for the following:  Physical        2023    11:15 AM   Additional Questions   Roomed by BRIGID Copeland       Healthy Habits:     Getting at least 3 servings of Calcium per day:  Yes    Bi-annual eye exam:  Yes    Dental care twice a year:  Yes    Sleep apnea or symptoms of sleep apnea:  None    Diet:  Regular (no restrictions)    Frequency of exercise:  2-3 days/week    Duration of exercise:  15-30 minutes    Taking medications regularly:  Yes    Medication side effects:  None    Additional concerns today:  No    -She has a skin spot on her breast.   Did just have a mammogram and everything was normal    -She would like to discuss weight loss.  Feels like it is harder to lose weight  Wondering about her thyroid levels - dose was adjusted in sept when her level was low but struggling to lose weight and feeling tired which she admits could be because she is 48, working, and has children    Social History     Tobacco Use    Smoking status: Never    Smokeless tobacco: Never   Substance Use Topics    Alcohol use: No             2023    11:14 AM   Alcohol Use   Prescreen: >3 drinks/day or >7 drinks/week? No     Reviewed orders with patient.  Reviewed health maintenance and updated orders accordingly - Yes  BP Readings from Last 3 Encounters:   23 122/66   22 114/76   22 110/80    Wt Readings from Last 3 Encounters:   23 66.7 kg (147 lb)   22 66.6 kg (146 lb 12.8 oz)   22 68.2 kg (150 lb 4 oz)                    Breast Cancer Screenin/9/2022     1:01 PM 2022    12:41 PM   Breast CA Risk Assessment (FHS-7)   Do you have a family history of breast, colon, or ovarian cancer? No / Unknown No / Unknown           Pertinent mammograms are reviewed under the imaging tab.    History of abnormal Pap smear: NO - age 30-65 PAP every 5 years with negative HPV co-testing recommended      Latest Ref Rng & Units 2022     1:28  PM 12/10/2019    10:15 AM 12/16/2016    12:00 AM   PAP / HPV   PAP  Negative for Intraepithelial Lesion or Malignancy (NILM)      HPV 16 DNA Negative Negative      HPV 18 DNA Negative Negative      HPV_EXT - HISTORICAL    See Scanned Report    Other HR HPV Negative Negative      PAP-ABSTRACT   See Scanned Document            This result is from an external source.     Reviewed and updated as needed this visit by clinical staff    Allergies  Meds              Reviewed and updated as needed this visit by Provider                     Review of Systems   Constitutional:  Negative for chills and fever.   HENT:  Negative for congestion, ear pain, hearing loss and sore throat.    Eyes:  Negative for pain and visual disturbance.   Respiratory:  Negative for cough and shortness of breath.    Cardiovascular:  Negative for chest pain, palpitations and peripheral edema.   Gastrointestinal:  Negative for abdominal pain, constipation, diarrhea, heartburn, hematochezia and nausea.   Breasts:  Negative for tenderness, breast mass and discharge.   Genitourinary:  Negative for dysuria, frequency, genital sores, hematuria, pelvic pain, urgency, vaginal bleeding and vaginal discharge.   Musculoskeletal:  Negative for arthralgias, joint swelling and myalgias.   Skin:  Negative for rash.   Neurological:  Negative for dizziness, weakness, headaches and paresthesias.   Psychiatric/Behavioral:  Negative for mood changes. The patient is not nervous/anxious.           OBJECTIVE:   /66   Pulse 74   Temp 97.1  F (36.2  C) (Tympanic)   Wt 66.7 kg (147 lb)   SpO2 100%   BMI 28.24 kg/m    Physical Exam  GENERAL: healthy, alert and no distress  EYES: Eyes grossly normal to inspection, PERRL and conjunctivae and sclerae normal  HENT: ear canals and TM's normal, nose and mouth without ulcers or lesions  NECK: no adenopathy, no asymmetry, masses, or scars and thyroid normal to palpation  RESP: lungs clear to auscultation - no rales,  rhonchi or wheezes  CV: regular rate and rhythm, normal S1 S2, no S3 or S4, no murmur, click or rub, no peripheral edema and peripheral pulses strong  ABDOMEN: soft, nontender, no hepatosplenomegaly, no masses and bowel sounds normal  MS: no gross musculoskeletal defects noted, no edema  SKIN: no suspicious lesions or rashes  NEURO: Normal strength and tone, mentation intact and speech normal  PSYCH: mentation appears normal, affect normal/bright    Diagnostic Test Results:  pending    ASSESSMENT/PLAN:   (Z00.00) Routine general medical examination at a health care facility  (primary encounter diagnosis)  Comment:   Plan: Lipid panel reflex to direct LDL Fasting,         Hemoglobin A1c, Comprehensive metabolic panel         (BMP + Alb, Alk Phos, ALT, AST, Total. Bili,         TP), CBC with platelets and differential,         Ferritin            (Z12.11) Screen for colon cancer  Comment:   Plan: Colonoscopy Screening  Referral            (Z11.4) Screening for HIV (human immunodeficiency virus)  Comment:   Plan: HIV Antigen Antibody Combo            (E03.9) Hypothyroidism, unspecified type  Comment:   Plan: TSH WITH FREE T4 REFLEX            (Z13.6) CARDIOVASCULAR SCREENING; LDL GOAL LESS THAN 160  Comment:   Plan: Lipid panel reflex to direct LDL Fasting,         Comprehensive metabolic panel (BMP + Alb, Alk         Phos, ALT, AST, Total. Bili, TP)            (R53.83) Other fatigue  Comment:   Plan: CBC with platelets and differential, Ferritin                Patient has been advised of split billing requirements and indicates understanding: Yes      COUNSELING:  Reviewed preventive health counseling, as reflected in patient instructions        She reports that she has never smoked. She has never used smokeless tobacco.        Poppy Mendoza PA-C  [facility]

## 2023-12-12 LAB — HIV 1+2 AB+HIV1 P24 AG SERPL QL IA: NONREACTIVE

## 2024-05-22 ENCOUNTER — OFFICE VISIT (OUTPATIENT)
Dept: FAMILY MEDICINE | Facility: CLINIC | Age: 49
End: 2024-05-22
Payer: COMMERCIAL

## 2024-05-22 VITALS
HEART RATE: 103 BPM | HEIGHT: 61 IN | RESPIRATION RATE: 16 BRPM | OXYGEN SATURATION: 100 % | TEMPERATURE: 98.5 F | DIASTOLIC BLOOD PRESSURE: 72 MMHG | WEIGHT: 135.25 LBS | BODY MASS INDEX: 25.54 KG/M2 | SYSTOLIC BLOOD PRESSURE: 110 MMHG

## 2024-05-22 DIAGNOSIS — F41.9 ANXIETY: Primary | ICD-10-CM

## 2024-05-22 DIAGNOSIS — Z00.00 HEALTHCARE MAINTENANCE: ICD-10-CM

## 2024-05-22 DIAGNOSIS — E03.9 HYPOTHYROIDISM, UNSPECIFIED TYPE: Chronic | ICD-10-CM

## 2024-05-22 DIAGNOSIS — L70.9 ACNE, UNSPECIFIED ACNE TYPE: ICD-10-CM

## 2024-05-22 LAB
CHOLEST SERPL-MCNC: 218 MG/DL
FASTING STATUS PATIENT QL REPORTED: NO
FERRITIN SERPL-MCNC: 342 NG/ML (ref 6–175)
HBA1C MFR BLD: 5.7 % (ref 0–5.6)
HDLC SERPL-MCNC: 42 MG/DL
HGB BLD-MCNC: 13.9 G/DL (ref 11.7–15.7)
LDLC SERPL CALC-MCNC: 120 MG/DL
NONHDLC SERPL-MCNC: 176 MG/DL
T4 FREE SERPL-MCNC: 1.73 NG/DL (ref 0.9–1.7)
TRIGL SERPL-MCNC: 280 MG/DL
TSH SERPL DL<=0.005 MIU/L-ACNC: 0.08 UIU/ML (ref 0.3–4.2)

## 2024-05-22 PROCEDURE — 84439 ASSAY OF FREE THYROXINE: CPT | Performed by: FAMILY MEDICINE

## 2024-05-22 PROCEDURE — 36415 COLL VENOUS BLD VENIPUNCTURE: CPT | Performed by: FAMILY MEDICINE

## 2024-05-22 PROCEDURE — 84443 ASSAY THYROID STIM HORMONE: CPT | Performed by: FAMILY MEDICINE

## 2024-05-22 PROCEDURE — G2211 COMPLEX E/M VISIT ADD ON: HCPCS | Performed by: FAMILY MEDICINE

## 2024-05-22 PROCEDURE — 80061 LIPID PANEL: CPT | Performed by: FAMILY MEDICINE

## 2024-05-22 PROCEDURE — 85018 HEMOGLOBIN: CPT | Performed by: FAMILY MEDICINE

## 2024-05-22 PROCEDURE — 82728 ASSAY OF FERRITIN: CPT | Performed by: FAMILY MEDICINE

## 2024-05-22 PROCEDURE — 83036 HEMOGLOBIN GLYCOSYLATED A1C: CPT | Performed by: FAMILY MEDICINE

## 2024-05-22 PROCEDURE — 99214 OFFICE O/P EST MOD 30 MIN: CPT | Performed by: FAMILY MEDICINE

## 2024-05-22 RX ORDER — MINOCYCLINE HYDROCHLORIDE 100 MG/1
CAPSULE ORAL
Qty: 30 CAPSULE | Refills: 2 | Status: SHIPPED | OUTPATIENT
Start: 2024-05-22 | End: 2024-09-03

## 2024-05-23 ENCOUNTER — TELEPHONE (OUTPATIENT)
Dept: FAMILY MEDICINE | Facility: CLINIC | Age: 49
End: 2024-05-23
Payer: COMMERCIAL

## 2024-05-23 NOTE — PROGRESS NOTES
Assessment/Plan:    Neida Rock is a 48 year old female presenting for:    Anxiety  Forms were filled out for this patient.  I believe that she benefits working from home and it seems as though she is able to do her job adequately that way.  She will let me know if there are any questions or concerns.    Acne, unspecified acne type  Refill minocycline sent to the pharmacy.  Discussed risk and benefits.  Discussed that she could consider using this daily for a few months.  Continue using topical.  - minocycline (MINOCIN) 100 MG capsule  Dispense: 30 capsule; Refill: 2    Healthcare maintenance  Reviewed all previous laboratory testing.  Discussed diet and physical activity.  - Hemoglobin A1c  - Lipid panel reflex to direct LDL Non-fasting  - Hemoglobin  - Ferritin  - Hemoglobin A1c  - Lipid panel reflex to direct LDL Non-fasting  - Hemoglobin  - Ferritin  - TSH with free T4 reflex  - TSH with free T4 reflex  - T4 free    Hypothyroidism, unspecified type  Reviewed all laboratory testing      I personally spent over 35 minutes performing care related to this patient on the day of the patient visit.  This includes chart review, precharting, talking with/ examining the patient as well as completing after visit documentation.      Medications Discontinued During This Encounter   Medication Reason    minocycline (MINOCIN) 100 MG capsule Reorder (No AVS)           Chief Complaint:  Forms and Thyroid Problem        Subjective:   Neida Rock is a very pleasant 48-year-old female with a past medical history significant for anxiety who presents to the clinic today to discuss her accommodation forms.    Patient has been working from home for several years.  Her company has been encouraging people to go back to the office but she states that she does not work well in the office that she gets very anxious and distracted.  She has a lot going on in her family life and does not feel comfortable for being away from home for that long  "as it causes significant anxiety.    She also states that she concentrates much better at home.  Anxiety at work is very problematic and causes difficulty with concentration.  We did fill out a form last year which worked well for her.  She does still go and occasionally for in person meetings.    Otherwise, she has a history of acne.  She uses minocycline occasionally.  Otherwise, she is over-the-counter Differin.  She would like a refill.    She is hopeful to get some repeat laboratory testing.  She had some labs done in late 2023 which showed an A1c that was slightly elevated into the prediabetic range.  She also hopeful to get her thyroid checked today.    12 point review of systems completed and negative except for what has been described above    History   Smoking Status    Never   Smokeless Tobacco    Never         Current Outpatient Medications:     levothyroxine (SYNTHROID/LEVOTHROID) 100 MCG tablet, TAKE 1 TABLET BY MOUTH DAILY, Disp: 90 tablet, Rfl: 2    minocycline (MINOCIN) 100 MG capsule, TAKE 1 CAPSULE BY MOUTH EVERY DAY AS NEEDED, Disp: 30 capsule, Rfl: 2    Multiple vitamin TABS, Take by mouth daily, Disp: , Rfl:       Objective:  Vitals:    05/22/24 1417   BP: 110/72   Pulse: 103   Resp: 16   Temp: 98.5  F (36.9  C)   TempSrc: Tympanic   SpO2: 100%   Weight: 61.3 kg (135 lb 4 oz)   Height: 1.537 m (5' 0.5\")       Body mass index is 25.98 kg/m .    Vital signs reviewed and stable  General: No acute distress  Psych: Appropriate affect  HEENT: moist mucous membranes, pupils equal, round, reactive to light and accomodation, tympanic membranes are pearly grey bilaterally  Lymph: no cervical or supraclavicular lymphadenopathy  Cardiovascular: regular rate and rhythm with no murmur  Pulmonary: clear to auscultation bilaterally with no wheeze  Abdomen: soft, non tender, non distended with normo-active bowel sounds  Extremities: warm and well perfused with no edema  Skin: warm and dry with no rash         " This note has been dictated and transcribed using voice recognition software.   Any errors in transcription are unintentional and inherent to the software.  Answers submitted by the patient for this visit:  Hypothyroid  (Submitted on 5/22/2024)  Chief Complaint: Chronic problems general questions HPI Form  Since last visit, patient describes the following symptoms:: None  General Questionnaire (Submitted on 5/22/2024)  Chief Complaint: Chronic problems general questions HPI Form  What is the reason for your visit today? : lab check and hr paperwork  How many servings of fruits and vegetables do you eat daily?: 2-3  On average, how many sweetened beverages do you drink each day (Examples: soda, juice, sweet tea, etc.  Do NOT count diet or artificially sweetened beverages)?: 1  How many minutes a day do you exercise enough to make your heart beat faster?: 10 to 19  How many days a week do you exercise enough to make your heart beat faster?: 3 or less  How many days per week do you miss taking your medication?: 0

## 2024-05-23 NOTE — TELEPHONE ENCOUNTER
Pt sent a work limitations form to be filled out .     Form was completed and faxed to [phone]

## 2024-06-03 ENCOUNTER — MYC REFILL (OUTPATIENT)
Dept: FAMILY MEDICINE | Facility: CLINIC | Age: 49
End: 2024-06-03
Payer: COMMERCIAL

## 2024-06-03 DIAGNOSIS — E03.9 HYPOTHYROIDISM, UNSPECIFIED TYPE: ICD-10-CM

## 2024-06-03 RX ORDER — LEVOTHYROXINE SODIUM 100 UG/1
100 TABLET ORAL DAILY
Qty: 90 TABLET | Refills: 0 | Status: SHIPPED | OUTPATIENT
Start: 2024-06-03 | End: 2024-06-03

## 2024-08-13 ENCOUNTER — ANESTHESIA EVENT (OUTPATIENT)
Dept: GASTROENTEROLOGY | Facility: CLINIC | Age: 49
End: 2024-08-13
Payer: COMMERCIAL

## 2024-08-13 NOTE — ANESTHESIA PREPROCEDURE EVALUATION
Anesthesia Pre-Procedure Evaluation    Patient: Neida Rock   MRN: [id number] : 1975        Procedure : Procedure(s):  Colonoscopy          Past Medical History:   Diagnosis Date    Gastritis 10/05/2017    History of Aseptic meningitis due to drug 06/15/2014    Aseptic meningitis , ascribed to bactrim.  Also had elevated bilirubin   ascribed to bactrim         Hyperthyroidism       Past Surgical History:   Procedure Laterality Date    ESOPHAGOSCOPY, GASTROSCOPY, DUODENOSCOPY (EGD), COMBINED N/A 10/13/2017    Procedure: ESOPHAGOGASTRODUODENOSCOPY (EGD) with biopsies;  Surgeon: Seth Cummings MD;  Location: [facility];  Service:     OTHER SURGICAL HISTORY      none      Allergies   Allergen Reactions    Sulfamethoxazole-Trimethoprim [Sulfamethoxazole-Trimethoprim] Other (See Comments)     Bactrim DS, Drug induced meningitis, Bactrim DS, Drug induced meningitis    Sulfa Antibiotics      Other reaction(s): *Unknown  Liver enzymes were off, drug- induced meningitis    Tramadol Hives      Social History     Tobacco Use    Smoking status: Never    Smokeless tobacco: Never   Substance Use Topics    Alcohol use: No      Wt Readings from Last 1 Encounters:   24 61.3 kg (135 lb 4 oz)        Anesthesia Evaluation   Pt has had prior anesthetic.         ROS/MED HX  ENT/Pulmonary:       Neurologic:       Cardiovascular:       METS/Exercise Tolerance: >4 METS    Hematologic:       Musculoskeletal:       GI/Hepatic: Comment: Hx gastritis      Renal/Genitourinary:       Endo:     (+)          thyroid problem, hypothyroidism hyperthyroidism,           Psychiatric/Substance Use:       Infectious Disease:       Malignancy:       Other:            Physical Exam    Airway  airway exam normal      Mallampati: II   TM distance: > 3 FB   Neck ROM: full   Mouth opening: > 3 cm    Respiratory Devices and Support         Dental  no notable dental history     (+) Minor Abnormalities - some fillings, tiny  "chips      Cardiovascular   cardiovascular exam normal          Pulmonary   pulmonary exam normal                OUTSIDE LABS:  CBC:   Lab Results   Component Value Date    WBC 8.5 12/11/2023    WBC 6.5 09/09/2022    HGB 13.9 05/22/2024    HGB 13.4 12/11/2023    HCT 41.1 12/11/2023    HCT 40.6 09/09/2022     12/11/2023     09/09/2022     BMP:   Lab Results   Component Value Date     12/11/2023     09/09/2022    POTASSIUM 4.8 12/11/2023    POTASSIUM 3.8 09/09/2022    CHLORIDE 105 12/11/2023    CHLORIDE 104 09/09/2022    CO2 25 12/11/2023    CO2 26 09/09/2022    BUN 8.6 12/11/2023    BUN 10.1 09/09/2022    CR 0.60 12/11/2023    CR 0.59 09/09/2022    GLC 98 12/11/2023     (H) 09/09/2022     COAGS: No results found for: \"PTT\", \"INR\", \"FIBR\"  POC: No results found for: \"BGM\", \"HCG\", \"HCGS\"  HEPATIC:   Lab Results   Component Value Date    ALBUMIN 4.5 12/11/2023    PROTTOTAL 7.5 12/11/2023    ALT 25 12/11/2023    AST 24 12/11/2023    ALKPHOS 84 12/11/2023    BILITOTAL 0.5 12/11/2023     OTHER:   Lab Results   Component Value Date    A1C 5.7 (H) 05/22/2024    VIOLET 9.6 12/11/2023    TSH 0.08 (L) 05/22/2024    T4 1.73 (H) 05/22/2024       Anesthesia Plan    ASA Status:  2    NPO Status:  NPO Appropriate    Anesthesia Type: General.   Induction: Propofol, Intravenous.   Maintenance: TIVA.        Consents    Anesthesia Plan(s) and associated risks, benefits, and realistic alternatives discussed. Questions answered and patient/representative(s) expressed understanding.     - Discussed: Risks, Benefits and Alternatives for BOTH SEDATION and the PROCEDURE were discussed     - Discussed with:  Patient            Postoperative Care    Pain management: Multi-modal analgesia, IV analgesics, Oral pain medications.   PONV prophylaxis: Ondansetron (or other 5HT-3), Dexamethasone or Solumedrol, Background Propofol Infusion     Comments:               Gagandeep Barclay CRNA, APRN CRNA    I have reviewed the " pertinent notes and labs in the chart from the past 30 days and (re)examined the patient.  Any updates or changes from those notes are reflected in this note.

## 2024-08-14 ENCOUNTER — HOSPITAL ENCOUNTER (OUTPATIENT)
Facility: CLINIC | Age: 49
Discharge: HOME | End: 2024-08-14
Attending: SURGERY | Admitting: SURGERY
Payer: COMMERCIAL

## 2024-08-14 ENCOUNTER — ANESTHESIA (OUTPATIENT)
Dept: GASTROENTEROLOGY | Facility: CLINIC | Age: 49
End: 2024-08-14
Payer: COMMERCIAL

## 2024-08-14 VITALS
HEART RATE: 76 BPM | OXYGEN SATURATION: 97 % | RESPIRATION RATE: 16 BRPM | DIASTOLIC BLOOD PRESSURE: 87 MMHG | SYSTOLIC BLOOD PRESSURE: 115 MMHG | TEMPERATURE: 97.5 F

## 2024-08-14 LAB — COLONOSCOPY: NORMAL

## 2024-08-14 PROCEDURE — 250N000011 HC RX IP 250 OP 636: Performed by: NURSE ANESTHETIST, CERTIFIED REGISTERED

## 2024-08-14 PROCEDURE — 370N000017 HC ANESTHESIA TECHNICAL FEE, PER MIN: Performed by: SURGERY

## 2024-08-14 PROCEDURE — 258N000003 HC RX IP 258 OP 636: Performed by: SURGERY

## 2024-08-14 PROCEDURE — 250N000009 HC RX 250: Performed by: SURGERY

## 2024-08-14 PROCEDURE — 250N000009 HC RX 250: Performed by: NURSE ANESTHETIST, CERTIFIED REGISTERED

## 2024-08-14 PROCEDURE — 45380 COLONOSCOPY AND BIOPSY: CPT | Performed by: SURGERY

## 2024-08-14 PROCEDURE — 88305 TISSUE EXAM BY PATHOLOGIST: CPT | Mod: 26 | Performed by: PATHOLOGY

## 2024-08-14 PROCEDURE — 88305 TISSUE EXAM BY PATHOLOGIST: CPT | Mod: TC | Performed by: SURGERY

## 2024-08-14 RX ORDER — LIDOCAINE 40 MG/G
CREAM TOPICAL
Status: DISCONTINUED | OUTPATIENT
Start: 2024-08-14 | End: 2024-08-14 | Stop reason: HOSPADM

## 2024-08-14 RX ORDER — PROPOFOL 10 MG/ML
INJECTION, EMULSION INTRAVENOUS CONTINUOUS PRN
Status: DISCONTINUED | OUTPATIENT
Start: 2024-08-14 | End: 2024-08-14

## 2024-08-14 RX ORDER — NALOXONE HYDROCHLORIDE 0.4 MG/ML
0.2 INJECTION, SOLUTION INTRAMUSCULAR; INTRAVENOUS; SUBCUTANEOUS
Status: DISCONTINUED | OUTPATIENT
Start: 2024-08-14 | End: 2024-08-14 | Stop reason: HOSPADM

## 2024-08-14 RX ORDER — FLUMAZENIL 0.1 MG/ML
0.2 INJECTION, SOLUTION INTRAVENOUS
Status: DISCONTINUED | OUTPATIENT
Start: 2024-08-14 | End: 2024-08-14 | Stop reason: HOSPADM

## 2024-08-14 RX ORDER — ONDANSETRON 2 MG/ML
4 INJECTION INTRAMUSCULAR; INTRAVENOUS EVERY 30 MIN PRN
Status: DISCONTINUED | OUTPATIENT
Start: 2024-08-14 | End: 2024-08-14 | Stop reason: HOSPADM

## 2024-08-14 RX ORDER — PROPOFOL 10 MG/ML
INJECTION, EMULSION INTRAVENOUS PRN
Status: DISCONTINUED | OUTPATIENT
Start: 2024-08-14 | End: 2024-08-14

## 2024-08-14 RX ORDER — ONDANSETRON 2 MG/ML
4 INJECTION INTRAMUSCULAR; INTRAVENOUS
Status: DISCONTINUED | OUTPATIENT
Start: 2024-08-14 | End: 2024-08-14 | Stop reason: HOSPADM

## 2024-08-14 RX ORDER — DEXAMETHASONE SODIUM PHOSPHATE 4 MG/ML
4 INJECTION, SOLUTION INTRA-ARTICULAR; INTRALESIONAL; INTRAMUSCULAR; INTRAVENOUS; SOFT TISSUE
Status: DISCONTINUED | OUTPATIENT
Start: 2024-08-14 | End: 2024-08-14 | Stop reason: HOSPADM

## 2024-08-14 RX ORDER — NALOXONE HYDROCHLORIDE 0.4 MG/ML
0.4 INJECTION, SOLUTION INTRAMUSCULAR; INTRAVENOUS; SUBCUTANEOUS
Status: DISCONTINUED | OUTPATIENT
Start: 2024-08-14 | End: 2024-08-14 | Stop reason: HOSPADM

## 2024-08-14 RX ORDER — SODIUM CHLORIDE, SODIUM LACTATE, POTASSIUM CHLORIDE, CALCIUM CHLORIDE 600; 310; 30; 20 MG/100ML; MG/100ML; MG/100ML; MG/100ML
INJECTION, SOLUTION INTRAVENOUS CONTINUOUS
Status: DISCONTINUED | OUTPATIENT
Start: 2024-08-14 | End: 2024-08-14 | Stop reason: HOSPADM

## 2024-08-14 RX ORDER — ONDANSETRON 4 MG/1
4 TABLET, ORALLY DISINTEGRATING ORAL EVERY 30 MIN PRN
Status: DISCONTINUED | OUTPATIENT
Start: 2024-08-14 | End: 2024-08-14 | Stop reason: HOSPADM

## 2024-08-14 RX ORDER — LIDOCAINE HYDROCHLORIDE 20 MG/ML
INJECTION, SOLUTION INFILTRATION; PERINEURAL PRN
Status: DISCONTINUED | OUTPATIENT
Start: 2024-08-14 | End: 2024-08-14

## 2024-08-14 RX ORDER — NALOXONE HYDROCHLORIDE 0.4 MG/ML
0.1 INJECTION, SOLUTION INTRAMUSCULAR; INTRAVENOUS; SUBCUTANEOUS
Status: DISCONTINUED | OUTPATIENT
Start: 2024-08-14 | End: 2024-08-14 | Stop reason: HOSPADM

## 2024-08-14 RX ADMIN — PROPOFOL 50 MG: 10 INJECTION, EMULSION INTRAVENOUS at 10:32

## 2024-08-14 RX ADMIN — PROPOFOL 200 MCG/KG/MIN: 10 INJECTION, EMULSION INTRAVENOUS at 10:32

## 2024-08-14 RX ADMIN — LIDOCAINE HYDROCHLORIDE 60 MG: 20 INJECTION, SOLUTION INFILTRATION; PERINEURAL at 10:32

## 2024-08-14 RX ADMIN — LIDOCAINE HYDROCHLORIDE 0.1 ML: 10 INJECTION, SOLUTION EPIDURAL; INFILTRATION; INTRACAUDAL; PERINEURAL at 09:38

## 2024-08-14 RX ADMIN — SODIUM CHLORIDE, POTASSIUM CHLORIDE, SODIUM LACTATE AND CALCIUM CHLORIDE: 600; 310; 30; 20 INJECTION, SOLUTION INTRAVENOUS at 09:38

## 2024-08-14 ASSESSMENT — ACTIVITIES OF DAILY LIVING (ADL)
ADLS_ACUITY_SCORE: 33
ADLS_ACUITY_SCORE: 35
ADLS_ACUITY_SCORE: 35

## 2024-08-14 NOTE — H&P
[facility]    Pre-Endoscopy History and Physical     Neida Rock MRN# [id number]   YOB: 1975 Age: 48 year old     Date of Procedure: 8/14/2024  Primary care provider: Zee Nelson  Type of Endoscopy: Colonoscopy with possible biopsy, possible polypectomy  Reason for Procedure: Screening  Type of Anesthesia Anticipated: Conscious Sedation    HPI:    Neida is a 48 year old female who will be undergoing the above procedure.      First colonoscopy.  Denies blood in stool or change in stool size.  No known family history of colon cancer.      A history and physical has been performed. The patient's medications and allergies have been reviewed. The risks and benefits of the procedure and the sedation options and risks were discussed with the patient.  All questions were answered and informed consent was obtained.      She denies a personal or family history of anesthesia complications or bleeding disorders.     Patient Active Problem List   Diagnosis    Hypothyroidism    Acne vulgaris    Status post vacuum-assisted vaginal delivery    Prediabetes        Past Medical History:   Diagnosis Date    Gastritis 10/05/2017    History of Aseptic meningitis due to drug 06/15/2014    Aseptic meningitis 2014, ascribed to bactrim.  Also had elevated bilirubin   ascribed to bactrim         Hyperthyroidism         Past Surgical History:   Procedure Laterality Date    ESOPHAGOSCOPY, GASTROSCOPY, DUODENOSCOPY (EGD), COMBINED N/A 10/13/2017    Procedure: ESOPHAGOGASTRODUODENOSCOPY (EGD) with biopsies;  Surgeon: Seth Cummings MD;  Location: [facility];  Service:     OTHER SURGICAL HISTORY      none       Social History     Tobacco Use    Smoking status: Never    Smokeless tobacco: Never   Substance Use Topics    Alcohol use: No       Family History   Problem Relation Age of Onset    Diabetes Paternal Grandmother     Diabetes Other     Diabetes Mother     Diabetes Sister     Diabetes  "Maternal Grandmother     Diabetes Sister     No Known Problems Father        Prior to Admission medications    Medication Sig Start Date End Date Taking? Authorizing Provider   levothyroxine (SYNTHROID/LEVOTHROID) 100 MCG tablet Take 1 tablet (100 mcg) by mouth daily 6/3/24  Yes Zee Nelson MD   minocycline (MINOCIN) 100 MG capsule TAKE 1 CAPSULE BY MOUTH EVERY DAY AS NEEDED 5/22/24  Yes Zee Nelson MD   Multiple vitamin TABS Take by mouth daily   Yes Reported, Patient       Allergies   Allergen Reactions    Sulfamethoxazole-Trimethoprim [Sulfamethoxazole-Trimethoprim] Other (See Comments)     Bactrim DS, Drug induced meningitis, Bactrim DS, Drug induced meningitis    Sulfa Antibiotics      Other reaction(s): *Unknown  Liver enzymes were off, drug- induced meningitis    Tramadol Hives        REVIEW OF SYSTEMS:   5 point ROS negative except as noted above in HPI, including Gen., Resp., CV, GI &  system review.    PHYSICAL EXAM:   /87   Pulse 84   Temp 98  F (36.7  C) (Oral)   Resp 15   SpO2 100%  Estimated body mass index is 25.98 kg/m  as calculated from the following:    Height as of 5/22/24: 1.537 m (5' 0.5\").    Weight as of 5/22/24: 61.3 kg (135 lb 4 oz).   Constitutional: Awake, alert, no acute distress.  Eyes: No scleral icterus.  Conjunctiva are without injection.  ENMT: Mucous membranes moist, dentition and gums are intact.   Neck: Soft, supple, trachea midline.    Endocrine: n/a   Lymphatic: There is no cervical, submandibularadenopathy.  Respiratory: normal effortgs   Cardiovascular: S1, S2  Abdomen: Non-distended, non-tender,  No masses,  Musculoskeletal: No spinal or CVA tenderness. Full range of motion in the upper and lower extremities.    Skin: No skin rashes or lesions to inspection.  No petechia.    Neurologic: alerted and oriented 3x  Psychiatric: The patient's affect is not blunted and mood is appropriate.  DIAGNOSTICS:    Not indicated    IMPRESSION   ASA Class " 2 - Mild systemic disease    PLAN:   Plan for Colonoscopy with possible biopsy, possible polypectomy. We discussed the risks, benefits and alternatives and the patient wished to proceed.  Patient is cleared for the above procedure.    The above has been forwarded to the consulting provider.    Chaitanya Frederick DO  [facility]

## 2024-08-14 NOTE — ANESTHESIA POSTPROCEDURE EVALUATION
Patient: Neida Rock    Procedure: Procedure(s):  COLONOSCOPY, WITH POLYPECTOMY AND BIOPSY       Anesthesia Type:  General    Note:  Disposition: Outpatient   Postop Pain Control: Uneventful            Sign Out: Well controlled pain   PONV: No   Neuro/Psych: Uneventful            Sign Out: Acceptable/Baseline neuro status   Airway/Respiratory: Uneventful            Sign Out: Acceptable/Baseline resp. status   CV/Hemodynamics: Uneventful            Sign Out: Acceptable CV status; No obvious hypovolemia; No obvious fluid overload   Other NRE: NONE   DID A NON-ROUTINE EVENT OCCUR? No           Last vitals:  Vitals Value Taken Time   /85 08/14/24 1053   Temp     Pulse 82 08/14/24 1053   Resp     SpO2 100 % 08/14/24 1055   Vitals shown include unfiled device data.    Electronically Signed By: RUSS Mcmanus CRNA  August 14, 2024  10:56 AM

## 2024-08-14 NOTE — OR NURSING
Pt drowsy and reoriented. Vss. Iv patent.  at bedside working. Pt doing well. Dr in to see pt and . Will sit up to eat and drink when awake and discharge when awake.

## 2024-08-14 NOTE — ANESTHESIA CARE TRANSFER NOTE
Patient: Neida Rock    Procedure: Procedure(s):  COLONOSCOPY, WITH POLYPECTOMY AND BIOPSY       Diagnosis: Screen for colon cancer [Z12.11]  Diagnosis Additional Information: No value filed.    Anesthesia Type:   General     Note:    Oropharynx: oropharynx clear of all foreign objects and spontaneously breathing  Level of Consciousness: awake  Oxygen Supplementation: room air    Independent Airway: airway patency satisfactory and stable  Dentition: dentition unchanged  Vital Signs Stable: post-procedure vital signs reviewed and stable  Report to RN Given: handoff report given  Patient transferred to: Phase II    Handoff Report: Identifed the Patient, Identified the Reponsible Provider, Reviewed the pertinent medical history, Discussed the surgical course, Reviewed Intra-OP anesthesia mangement and issues during anesthesia, Set expectations for post-procedure period and Allowed opportunity for questions and acknowledgement of understanding      Vitals:  Vitals Value Taken Time   /85 08/14/24 1053   Temp     Pulse 82 08/14/24 1053   Resp     SpO2 100 % 08/14/24 1055   Vitals shown include unfiled device data.    Electronically Signed By: RUSS Mcmanus CRNA  August 14, 2024  10:56 AM

## 2024-08-14 NOTE — LETTER
Neida Rock  [address] [place] [address]      August 21, 2024    Dear Neida,  This letter is written to inform you of the results of your recent colonoscopy.  Your examination showed polyp(s) in your ascending colon. All polyps were removed in their entirety and sent for review by a pathologist. As you will see on the pathology report below, the tissue(s) were tubular adenomatous polyps. Your examination was otherwise without abnormality.    Adenomatous polyps are entirely benign (non-cancerous); however, patients who have developed these polyps are at an increased risk for developing additional polyps in the future. If these are not eventually removed, there is a risk of developing colon cancer. We will advise more frequent examinations with you because of the risk associated with this type of polyp.    Final Diagnosis   A. Colon, Ascending, polyp, polypectomy:  -Tubular adenoma  -Negative for high-grade dysplasia and malignancy.       Given these findings, your personal history of polyps, I recommend that you undergo a repeat colonoscopy in 7 year(s) for surveillance. We will enter you into a recall system so you receive a reminder closer to the time that you are due for repeat examination.     Please remember that this recommendation is made with the understanding that you are not experiencing persistent changes in bowel function, bleeding per rectum, and/or significant abdominal pain. If you experience these symptoms, please contact your primary care provider for a further evaluation.     If you have any questions or concerns about the results of your colonoscopy or the appropriate follow-up, please contact my assistant at [phone]    Sincerely,      Chaitanya Frederick, DO   [facility]  ___

## 2024-08-15 LAB
PATH REPORT.COMMENTS IMP SPEC: NORMAL
PATH REPORT.COMMENTS IMP SPEC: NORMAL
PATH REPORT.FINAL DX SPEC: NORMAL
PATH REPORT.GROSS SPEC: NORMAL
PATH REPORT.MICROSCOPIC SPEC OTHER STN: NORMAL
PATH REPORT.RELEVANT HX SPEC: NORMAL
PHOTO IMAGE: NORMAL

## 2024-08-29 DIAGNOSIS — E03.9 HYPOTHYROIDISM, UNSPECIFIED TYPE: ICD-10-CM

## 2024-08-29 RX ORDER — LEVOTHYROXINE SODIUM 100 UG/1
100 TABLET ORAL DAILY
Qty: 90 TABLET | Refills: 2 | Status: SHIPPED | OUTPATIENT
Start: 2024-08-29

## 2024-08-30 ENCOUNTER — PATIENT OUTREACH (OUTPATIENT)
Dept: GASTROENTEROLOGY | Facility: CLINIC | Age: 49
End: 2024-08-30
Payer: COMMERCIAL

## 2024-09-03 ENCOUNTER — MYC REFILL (OUTPATIENT)
Dept: FAMILY MEDICINE | Facility: CLINIC | Age: 49
End: 2024-09-03
Payer: COMMERCIAL

## 2024-09-03 DIAGNOSIS — L70.9 ACNE, UNSPECIFIED ACNE TYPE: ICD-10-CM

## 2024-09-04 RX ORDER — MINOCYCLINE HYDROCHLORIDE 100 MG/1
CAPSULE ORAL
Qty: 30 CAPSULE | Refills: 2 | Status: SHIPPED | OUTPATIENT
Start: 2024-09-04

## 2024-09-08 ENCOUNTER — MYC MEDICAL ADVICE (OUTPATIENT)
Dept: FAMILY MEDICINE | Facility: CLINIC | Age: 49
End: 2024-09-08
Payer: COMMERCIAL

## 2024-09-09 NOTE — TELEPHONE ENCOUNTER
Call placed to patient   Discussed Paxlovid parameters and indications     Due to patient being day 5 of symptoms, patient deferred Paxlovid treatment and will continue to treat with OTC medications   No further questions/concerns    Trav Ramirez, Clinic RN  [facility]

## 2024-11-11 DIAGNOSIS — L70.9 ACNE, UNSPECIFIED ACNE TYPE: ICD-10-CM

## 2024-11-11 RX ORDER — MINOCYCLINE HYDROCHLORIDE 100 MG/1
CAPSULE ORAL
Qty: 30 CAPSULE | Refills: 2 | Status: SHIPPED | OUTPATIENT
Start: 2024-11-11

## 2024-11-26 ENCOUNTER — ANCILLARY PROCEDURE (OUTPATIENT)
Dept: MAMMOGRAPHY | Facility: CLINIC | Age: 49
End: 2024-11-26
Attending: FAMILY MEDICINE
Payer: COMMERCIAL

## 2024-11-26 DIAGNOSIS — Z12.31 VISIT FOR SCREENING MAMMOGRAM: ICD-10-CM

## 2024-11-26 PROCEDURE — 77063 BREAST TOMOSYNTHESIS BI: CPT

## 2025-02-12 DIAGNOSIS — L70.9 ACNE, UNSPECIFIED ACNE TYPE: ICD-10-CM

## 2025-02-13 RX ORDER — MINOCYCLINE HYDROCHLORIDE 100 MG/1
CAPSULE ORAL
Qty: 30 CAPSULE | Refills: 2 | Status: SHIPPED | OUTPATIENT
Start: 2025-02-13

## 2025-03-03 ENCOUNTER — MYC REFILL (OUTPATIENT)
Dept: FAMILY MEDICINE | Facility: CLINIC | Age: 50
End: 2025-03-03
Payer: COMMERCIAL

## 2025-03-03 DIAGNOSIS — R73.03 PREDIABETES: ICD-10-CM

## 2025-03-04 RX ORDER — METFORMIN HYDROCHLORIDE 500 MG/1
1000 TABLET, EXTENDED RELEASE ORAL
Qty: 180 TABLET | Refills: 0 | Status: SHIPPED | OUTPATIENT
Start: 2025-03-04

## 2025-04-23 DIAGNOSIS — L70.9 ACNE, UNSPECIFIED ACNE TYPE: ICD-10-CM

## 2025-04-24 RX ORDER — MINOCYCLINE HYDROCHLORIDE 100 MG/1
CAPSULE ORAL
Qty: 30 CAPSULE | Refills: 1 | Status: SHIPPED | OUTPATIENT
Start: 2025-04-24

## 2025-05-14 ENCOUNTER — VIRTUAL VISIT (OUTPATIENT)
Dept: FAMILY MEDICINE | Facility: CLINIC | Age: 50
End: 2025-05-14
Payer: COMMERCIAL

## 2025-05-14 DIAGNOSIS — Z00.00 ROUTINE GENERAL MEDICAL EXAMINATION AT A HEALTH CARE FACILITY: ICD-10-CM

## 2025-05-14 DIAGNOSIS — R73.03 PREDIABETES: ICD-10-CM

## 2025-05-14 DIAGNOSIS — F41.9 ANXIETY: Primary | ICD-10-CM

## 2025-05-14 PROCEDURE — 98005 SYNCH AUDIO-VIDEO EST LOW 20: CPT | Performed by: FAMILY MEDICINE

## 2025-05-14 NOTE — PROGRESS NOTES
Tonya is a 49 year old who is being evaluated via a billable video visit.    How would you like to obtain your AVS? MyChart  If the video visit is dropped, the invitation should be resent by: Text to cell phone: [phone]  Will anyone else be joining your video visit? No      Assessment & Plan     Anxiety  Reasonable accommodations form was filled out for work.  She will continue to work remotely as this is helpful with her anxiety.  Okay to go to the office for meetings and important events.    Prediabetes  Last A1c was 6.4.  She was started on metformin which has been helpful with sugar cravings.  Will recheck A1c  - HEMOGLOBIN A1C; Future    Routine general medical examination at a health care facility  Laboratory testing as below.  Patient will schedule a future fasting appointment  - Lipid panel reflex to direct LDL Fasting; Future  - Basic metabolic panel  (Ca, Cl, CO2, Creat, Gluc, K, Na, BUN); Future                Subjective   Tonya is a 49 year old, presenting for the following health issues:  No chief complaint on file.    DAMARIS Vang is a very pleasant 49-year-old female who presents via video visit for several concerns:    1.  Anxiety: Patient has a history of anxiety.  She has been historically working from home and doing very well with this.  She states when she goes to the office she tends to get more anxious and distracted.  She states that she is not as productive due to some of these issues.  She is hopeful to get a reasonable accommodations continued to work at home.  She does go to the office for important meetings and events if needed.  She is able to complete her job adequately from home.    2.  Prediabetes: Last A1c was 6.4.  We started her on metformin which was meant to help with some sugar cravings.  She thinks that this has helped and she is has had a bit less of an appetite as well.  She is hopeful that her A1c will be in a better range.  She would like to get this rechecked.           Objective           Vitals:  No vitals were obtained today due to virtual visit.    Physical Exam   GENERAL: alert and no distress  EYES: Eyes grossly normal to inspection.  No discharge or erythema, or obvious scleral/conjunctival abnormalities.  RESP: No audible wheeze, cough, or visible cyanosis.    SKIN: Visible skin clear. No significant rash, abnormal pigmentation or lesions.  NEURO: Cranial nerves grossly intact.  Mentation and speech appropriate for age.  PSYCH: Appropriate affect, tone, and pace of words          Video-Visit Details    Type of service:  Video Visit   Originating Location (pt. Location): Home    Distant Location (provider location):  On-site  Platform used for Video Visit: Gautam  Signed Electronically by: Zee Nelson MD

## 2025-05-28 ENCOUNTER — LAB (OUTPATIENT)
Dept: LAB | Facility: CLINIC | Age: 50
End: 2025-05-28
Payer: COMMERCIAL

## 2025-05-28 DIAGNOSIS — R73.03 PREDIABETES: ICD-10-CM

## 2025-05-28 DIAGNOSIS — Z00.00 ROUTINE GENERAL MEDICAL EXAMINATION AT A HEALTH CARE FACILITY: ICD-10-CM

## 2025-05-28 DIAGNOSIS — E03.9 HYPOTHYROIDISM, UNSPECIFIED TYPE: ICD-10-CM

## 2025-05-28 LAB
ANION GAP SERPL CALCULATED.3IONS-SCNC: 12 MMOL/L (ref 7–15)
BUN SERPL-MCNC: 8.6 MG/DL (ref 6–20)
CALCIUM SERPL-MCNC: 9.5 MG/DL (ref 8.8–10.4)
CHLORIDE SERPL-SCNC: 103 MMOL/L (ref 98–107)
CHOLEST SERPL-MCNC: 214 MG/DL
CREAT SERPL-MCNC: 0.68 MG/DL (ref 0.51–0.95)
EGFRCR SERPLBLD CKD-EPI 2021: >90 ML/MIN/1.73M2
EST. AVERAGE GLUCOSE BLD GHB EST-MCNC: 114 MG/DL
FASTING STATUS PATIENT QL REPORTED: YES
FASTING STATUS PATIENT QL REPORTED: YES
GLUCOSE SERPL-MCNC: 98 MG/DL (ref 70–99)
HBA1C MFR BLD: 5.6 % (ref 0–5.6)
HCO3 SERPL-SCNC: 22 MMOL/L (ref 22–29)
HDLC SERPL-MCNC: 34 MG/DL
LDLC SERPL CALC-MCNC: ABNORMAL MG/DL
NONHDLC SERPL-MCNC: 180 MG/DL
POTASSIUM SERPL-SCNC: 4.3 MMOL/L (ref 3.4–5.3)
SODIUM SERPL-SCNC: 137 MMOL/L (ref 135–145)
TRIGL SERPL-MCNC: 696 MG/DL
TSH SERPL DL<=0.005 MIU/L-ACNC: 0.78 UIU/ML (ref 0.3–4.2)

## 2025-05-28 PROCEDURE — 80048 BASIC METABOLIC PNL TOTAL CA: CPT

## 2025-05-28 PROCEDURE — 83036 HEMOGLOBIN GLYCOSYLATED A1C: CPT

## 2025-05-28 PROCEDURE — 36415 COLL VENOUS BLD VENIPUNCTURE: CPT

## 2025-05-28 PROCEDURE — 83721 ASSAY OF BLOOD LIPOPROTEIN: CPT | Mod: 59

## 2025-05-28 PROCEDURE — 84443 ASSAY THYROID STIM HORMONE: CPT

## 2025-05-28 PROCEDURE — 80061 LIPID PANEL: CPT

## 2025-05-29 ENCOUNTER — MYC MEDICAL ADVICE (OUTPATIENT)
Dept: FAMILY MEDICINE | Facility: CLINIC | Age: 50
End: 2025-05-29
Payer: COMMERCIAL

## 2025-05-29 ENCOUNTER — RESULTS FOLLOW-UP (OUTPATIENT)
Dept: FAMILY MEDICINE | Facility: CLINIC | Age: 50
End: 2025-05-29

## 2025-05-29 DIAGNOSIS — E78.1 HYPERTRIGLYCERIDEMIA: ICD-10-CM

## 2025-05-29 DIAGNOSIS — Z00.00 ROUTINE GENERAL MEDICAL EXAMINATION AT A HEALTH CARE FACILITY: Primary | ICD-10-CM

## 2025-05-29 LAB — LDLC SERPL DIRECT ASSAY-MCNC: 60 MG/DL

## 2025-05-31 DIAGNOSIS — E03.9 HYPOTHYROIDISM, UNSPECIFIED TYPE: ICD-10-CM

## 2025-06-02 RX ORDER — LEVOTHYROXINE SODIUM 100 UG/1
100 TABLET ORAL DAILY
Qty: 90 TABLET | Refills: 2 | Status: SHIPPED | OUTPATIENT
Start: 2025-06-02

## 2025-06-23 ENCOUNTER — MYC REFILL (OUTPATIENT)
Dept: FAMILY MEDICINE | Facility: CLINIC | Age: 50
End: 2025-06-23
Payer: COMMERCIAL

## 2025-06-23 DIAGNOSIS — R73.03 PREDIABETES: ICD-10-CM

## 2025-06-24 RX ORDER — METFORMIN HYDROCHLORIDE 500 MG/1
1000 TABLET, EXTENDED RELEASE ORAL
Qty: 180 TABLET | Refills: 0 | Status: SHIPPED | OUTPATIENT
Start: 2025-06-24

## 2025-08-12 ENCOUNTER — MYC REFILL (OUTPATIENT)
Dept: FAMILY MEDICINE | Facility: CLINIC | Age: 50
End: 2025-08-12
Payer: COMMERCIAL

## 2025-08-12 DIAGNOSIS — L70.9 ACNE, UNSPECIFIED ACNE TYPE: ICD-10-CM

## 2025-08-12 DIAGNOSIS — R73.03 PREDIABETES: ICD-10-CM

## 2025-08-13 RX ORDER — MINOCYCLINE HYDROCHLORIDE 100 MG/1
CAPSULE ORAL
Qty: 30 CAPSULE | Refills: 0 | Status: SHIPPED | OUTPATIENT
Start: 2025-08-13

## 2025-08-13 RX ORDER — METFORMIN HYDROCHLORIDE 500 MG/1
1000 TABLET, EXTENDED RELEASE ORAL
Qty: 180 TABLET | Refills: 0 | Status: SHIPPED | OUTPATIENT
Start: 2025-08-13

## (undated) DEVICE — ENDO FORCEP ENDOJAW BIOPSY 3.7MMX230CM FB-222U

## (undated) RX ORDER — LIDOCAINE HYDROCHLORIDE 10 MG/ML
INJECTION, SOLUTION EPIDURAL; INFILTRATION; INTRACAUDAL; PERINEURAL
Status: DISPENSED
Start: 2024-08-14